# Patient Record
Sex: FEMALE | Race: WHITE | Employment: FULL TIME | URBAN - METROPOLITAN AREA
[De-identification: names, ages, dates, MRNs, and addresses within clinical notes are randomized per-mention and may not be internally consistent; named-entity substitution may affect disease eponyms.]

---

## 2018-05-12 LAB
ANTIBODY SCREEN, EXTERNAL: NEGATIVE
CHLAMYDIA, EXTERNAL: NEGATIVE
HBSAG, EXTERNAL: NEGATIVE
HCT, EXTERNAL: 38.6
HGB, EXTERNAL: 12.6
HIV, EXTERNAL: NORMAL
PLATELET CNT,   EXTERNAL: 424
RPR, EXTERNAL: NORMAL
RUBELLA, EXTERNAL: NORMAL
TYPE, ABO & RH, EXTERNAL: NORMAL

## 2018-08-10 ENCOUNTER — HOSPITAL ENCOUNTER (OUTPATIENT)
Dept: PERINATAL CARE | Age: 33
Discharge: HOME | End: 2018-08-10
Attending: OBSTETRICS & GYNECOLOGY
Payer: COMMERCIAL

## 2018-08-10 PROCEDURE — 76805 OB US >/= 14 WKS SNGL FETUS: CPT | Performed by: OBSTETRICS & GYNECOLOGY

## 2018-09-12 ENCOUNTER — HOSPITAL ENCOUNTER (OUTPATIENT)
Dept: PERINATAL CARE | Age: 33
Discharge: HOME | End: 2018-09-12
Attending: OBSTETRICS & GYNECOLOGY
Payer: COMMERCIAL

## 2018-09-12 PROCEDURE — 76816 OB US FOLLOW-UP PER FETUS: CPT | Performed by: OBSTETRICS & GYNECOLOGY

## 2018-09-20 LAB — GTT, 1 HR, GLUCOLA, EXTERNAL: 75

## 2018-10-31 ENCOUNTER — HOSPITAL ENCOUNTER (OUTPATIENT)
Dept: PERINATAL CARE | Age: 33
Discharge: HOME | End: 2018-10-31
Attending: OBSTETRICS & GYNECOLOGY
Payer: COMMERCIAL

## 2018-10-31 PROCEDURE — 76816 OB US FOLLOW-UP PER FETUS: CPT | Performed by: OBSTETRICS & GYNECOLOGY

## 2018-11-14 LAB — GRBS, EXTERNAL: NEGATIVE

## 2018-11-28 ENCOUNTER — HOSPITAL ENCOUNTER (OUTPATIENT)
Dept: PERINATAL CARE | Age: 33
Discharge: HOME | End: 2018-11-28
Attending: OBSTETRICS & GYNECOLOGY
Payer: COMMERCIAL

## 2018-11-28 ENCOUNTER — HOSPITAL ENCOUNTER (EMERGENCY)
Age: 33
Discharge: HOME | End: 2018-11-28
Attending: OBSTETRICS & GYNECOLOGY | Admitting: OBSTETRICS & GYNECOLOGY
Payer: COMMERCIAL

## 2018-11-28 VITALS
HEART RATE: 72 BPM | TEMPERATURE: 98.2 F | SYSTOLIC BLOOD PRESSURE: 137 MMHG | RESPIRATION RATE: 14 BRPM | OXYGEN SATURATION: 99 % | BODY MASS INDEX: 24.8 KG/M2 | WEIGHT: 140 LBS | DIASTOLIC BLOOD PRESSURE: 88 MMHG | HEIGHT: 63 IN

## 2018-11-28 LAB
ALBUMIN SERPL-MCNC: 3 G/DL (ref 3.5–5)
ALBUMIN/GLOB SERPL: 0.9 {RATIO} (ref 1.1–2.2)
ALP SERPL-CCNC: 202 U/L (ref 45–117)
ALT SERPL-CCNC: 13 U/L (ref 12–78)
ANION GAP SERPL CALC-SCNC: 16 MMOL/L (ref 5–15)
AST SERPL-CCNC: 18 U/L (ref 15–37)
BASOPHILS # BLD: 0 K/UL (ref 0–0.1)
BASOPHILS NFR BLD: 0 % (ref 0–1)
BILIRUB SERPL-MCNC: 0.6 MG/DL (ref 0.2–1)
BUN SERPL-MCNC: 8 MG/DL (ref 6–20)
BUN/CREAT SERPL: 14 (ref 12–20)
CALCIUM SERPL-MCNC: 8.8 MG/DL (ref 8.5–10.1)
CHLORIDE SERPL-SCNC: 105 MMOL/L (ref 97–108)
CO2 SERPL-SCNC: 20 MMOL/L (ref 21–32)
CREAT SERPL-MCNC: 0.58 MG/DL (ref 0.55–1.02)
CREAT UR-MCNC: 27.37 MG/DL
DIFFERENTIAL METHOD BLD: ABNORMAL
EOSINOPHIL # BLD: 0.1 K/UL (ref 0–0.4)
EOSINOPHIL NFR BLD: 1 % (ref 0–7)
ERYTHROCYTE [DISTWIDTH] IN BLOOD BY AUTOMATED COUNT: 12.8 % (ref 11.5–14.5)
GLOBULIN SER CALC-MCNC: 3.2 G/DL (ref 2–4)
GLUCOSE SERPL-MCNC: 82 MG/DL (ref 65–100)
HCT VFR BLD AUTO: 38.3 % (ref 35–47)
HGB BLD-MCNC: 13.4 G/DL (ref 11.5–16)
IMM GRANULOCYTES # BLD: 0.1 K/UL (ref 0–0.04)
IMM GRANULOCYTES NFR BLD AUTO: 1 % (ref 0–0.5)
LDH SERPL L TO P-CCNC: 133 U/L (ref 81–246)
LYMPHOCYTES # BLD: 1.9 K/UL (ref 0.8–3.5)
LYMPHOCYTES NFR BLD: 18 % (ref 12–49)
MCH RBC QN AUTO: 32.8 PG (ref 26–34)
MCHC RBC AUTO-ENTMCNC: 35 G/DL (ref 30–36.5)
MCV RBC AUTO: 93.6 FL (ref 80–99)
MONOCYTES # BLD: 0.8 K/UL (ref 0–1)
MONOCYTES NFR BLD: 8 % (ref 5–13)
NEUTS SEG # BLD: 7.6 K/UL (ref 1.8–8)
NEUTS SEG NFR BLD: 73 % (ref 32–75)
NRBC # BLD: 0 K/UL (ref 0–0.01)
NRBC BLD-RTO: 0 PER 100 WBC
PLATELET # BLD AUTO: 227 K/UL (ref 150–400)
PMV BLD AUTO: 11.5 FL (ref 8.9–12.9)
POTASSIUM SERPL-SCNC: 4.2 MMOL/L (ref 3.5–5.1)
PROT SERPL-MCNC: 6.2 G/DL (ref 6.4–8.2)
PROT UR-MCNC: <6 MG/DL (ref 0–11.9)
PROT/CREAT UR-RTO: NORMAL
RBC # BLD AUTO: 4.09 M/UL (ref 3.8–5.2)
SODIUM SERPL-SCNC: 141 MMOL/L (ref 136–145)
URATE SERPL-MCNC: 4.5 MG/DL (ref 2.6–6)
WBC # BLD AUTO: 10.4 K/UL (ref 3.6–11)

## 2018-11-28 PROCEDURE — 85025 COMPLETE CBC W/AUTO DIFF WBC: CPT

## 2018-11-28 PROCEDURE — 84156 ASSAY OF PROTEIN URINE: CPT

## 2018-11-28 PROCEDURE — 36415 COLL VENOUS BLD VENIPUNCTURE: CPT

## 2018-11-28 PROCEDURE — 83615 LACTATE (LD) (LDH) ENZYME: CPT

## 2018-11-28 PROCEDURE — 84550 ASSAY OF BLOOD/URIC ACID: CPT

## 2018-11-28 PROCEDURE — 80053 COMPREHEN METABOLIC PANEL: CPT

## 2018-11-28 PROCEDURE — 59025 FETAL NON-STRESS TEST: CPT

## 2018-11-28 PROCEDURE — 99285 EMERGENCY DEPT VISIT HI MDM: CPT

## 2018-11-28 PROCEDURE — 76816 OB US FOLLOW-UP PER FETUS: CPT | Performed by: OBSTETRICS & GYNECOLOGY

## 2018-11-28 RX ORDER — ACETAMINOPHEN 325 MG/1
650 TABLET ORAL
COMMUNITY
End: 2018-12-08

## 2018-11-28 RX ORDER — VALACYCLOVIR HYDROCHLORIDE 1 G/1
1000 TABLET, FILM COATED ORAL
COMMUNITY
End: 2018-12-08

## 2018-11-28 RX ORDER — RANITIDINE HCL 75 MG
75 TABLET ORAL 2 TIMES DAILY
COMMUNITY
End: 2020-08-11

## 2018-11-28 NOTE — H&P
Obstetrics Admission H&P Delfino Nicole [id number] 
1985 Chief Complaint:  Pregnancy and Chronic Hypertension HPI: 29 y.o. female  38w0d with Estimated Date of Delivery: 18 Pregnancy has been complicated by chronic hypertension. Hx of HTN on no meds. Had 24hr urine early in pregnancy that was 400mg. Presented to office this AM for routine visit. Had mild range pressures. Denied HA, RUQ pain, LE edema. Seen at [facility] today for marginal cord insertion. No issues. ROS:  Pertinent items are noted in HPI. OB History  Para Term  AB Living 2       1 SAB TAB Ectopic Molar Multiple Live Births 1 Past Medical History:  
Diagnosis Date  Bilobed placenta 2018  Essential hypertension CHTN never been medicated  Herpes simplex virus (HSV) infection  Marginal insertion of umbilical cord affecting management of mother 2018  Psychiatric problem   
 anxiety and depression as a teenager- was medicated; nothing currently and has not been medicated in ~18 years Past Surgical History:  
Procedure Laterality Date  HX TUMOR REMOVAL   Social History Socioeconomic History  Marital status:  Spouse name: Not on file  Number of children: Not on file  Years of education: Not on file  Highest education level: Not on file Social Needs  Financial resource strain: Not on file  Food insecurity - worry: Not on file  Food insecurity - inability: Not on file  Transportation needs - medical: Not on file  Transportation needs - non-medical: Not on file Occupational History  Not on file Tobacco Use  Smoking status: Former Smoker  Smokeless tobacco: Never Used Substance and Sexual Activity  Alcohol use: No  
  Alcohol/week: 0.0 oz Frequency: Never  Drug use: No  
 Sexual activity: Yes  
  Partners: Male Birth control/protection: Pill Other Topics Concern [address] Service Not Asked  Blood Transfusions Not Asked  Caffeine Concern Not Asked  Occupational Exposure Not Asked [place] Hazards Not Asked  Sleep Concern Not Asked  Stress Concern Not Asked  Weight Concern Not Asked  Special Diet Not Asked  Back Care Not Asked  Exercise Not Asked  Bike Helmet Not Asked  Seat Belt Not Asked  Self-Exams Not Asked Social History Narrative  Not on file Family History Problem Relation Age of Onset  Cancer Maternal Grandfather  Elevated Lipids Mother No Known Allergies Prior to Admission Medications Prescriptions Last Dose Informant Patient Reported? Taking? BABY ASPIRIN PO 11/27/2018 at Unknown time  Yes Yes Sig: Take  by mouth. acetaminophen (TYLENOL) 325 mg tablet 11/21/2018 at Unknown time  Yes Yes Sig: Take 650 mg by mouth every six (6) hours as needed for Pain. doxylamine succinate (UNISOM) 25 mg tablet 11/27/2018 at Unknown time  Yes Yes Sig: Take 25 mg by mouth nightly as needed for Sleep.  
norgestimate-ethinyl estradiol (ORTHO TRI-CYCLEN, TRI-SPRINTEC) 0.18/0.215/0.25 mg-35 mcg (28) tab Not Taking at Unknown time  Yes No  
Sig: TAKE ONE TABLET BY MOUTH ONE TIME DAILY (SKIP LAST 7 DAYS AND START NEW PACK) raNITIdine (ZANTAC 75) 75 mg tab 11/27/2018 at Unknown time  Yes Yes Sig: Take 75 mg by mouth two (2) times a day. valACYclovir (VALTREX) 1 gram tablet 11/27/2018 at Unknown time  Yes Yes Sig: Take 1,000 mg by mouth. Facility-Administered Medications: None Vitals:   
Patient Vitals for the past 8 hrs: 
 BP Temp Pulse Resp SpO2 Height Weight 11/28/18 1158     100 %    
11/28/18 1155 (!) 134/93  79      
11/28/18 1143     99 %    
11/28/18 1141 135/78  84      
11/28/18 1124 (!) 136/95  79  100 %    
11/28/18 1109 (!) 136/97  90  99 %    
11/28/18 1056 139/87  85      
11/28/18 1054     100 %   18 [place] 5' 3\" (1.6 m) 63.5 kg (140 lb)  
18 1040 (!) 136/92 98.2 °F (36.8 °C) 77 14     
18 1039     99 %    
18 1015 (!) 144/95  75     Temp (24hrs), Av.2 °F (36.8 °C), Min:98.2 °F (36.8 °C), Max:98.2 °F (36.8 °C) I&O:  No intake/output data recorded. No intake/output data recorded. Exam:  Patient without distress. Abdomen soft, non-tender Fundus soft and non tender Perineum No sign of blood or amniotic fluid Lower extremities edema No 
 
Cervical Exam: ftp/L/hi in office Membranes:   Intact Fetal Heart Rate: Reactive Baseline: 120 per minute Variability: moderate Accelerations: yes Decelerations: none Uterine contractions: irregular, every 2-5 minutes, notes mild discomfort Labs:  
Recent Results (from the past 24 hour(s)) CBC WITH AUTOMATED DIFF Collection Time: 18 10:28 AM  
Result Value Ref Range WBC 10.4 3.6 - 11.0 K/uL  
 RBC 4.09 3.80 - 5.20 M/uL  
 HGB 13.4 11.5 - 16.0 g/dL HCT 38.3 35.0 - 47.0 % MCV 93.6 80.0 - 99.0 FL  
 MCH 32.8 26.0 - 34.0 PG  
 MCHC 35.0 30.0 - 36.5 g/dL  
 RDW 12.8 11.5 - 14.5 % PLATELET 904 408 - 870 K/uL MPV 11.5 8.9 - 12.9 FL  
 NRBC 0.0 0  WBC ABSOLUTE NRBC 0.00 0.00 - 0.01 K/uL NEUTROPHILS 73 32 - 75 % LYMPHOCYTES 18 12 - 49 % MONOCYTES 8 5 - 13 % EOSINOPHILS 1 0 - 7 % BASOPHILS 0 0 - 1 % IMMATURE GRANULOCYTES 1 (H) 0.0 - 0.5 % ABS. NEUTROPHILS 7.6 1.8 - 8.0 K/UL  
 ABS. LYMPHOCYTES 1.9 0.8 - 3.5 K/UL  
 ABS. MONOCYTES 0.8 0.0 - 1.0 K/UL  
 ABS. EOSINOPHILS 0.1 0.0 - 0.4 K/UL  
 ABS. BASOPHILS 0.0 0.0 - 0.1 K/UL  
 ABS. IMM. GRANS. 0.1 (H) 0.00 - 0.04 K/UL  
 DF AUTOMATED METABOLIC PANEL, COMPREHENSIVE Collection Time: 18 10:28 AM  
Result Value Ref Range Sodium 141 136 - 145 mmol/L Potassium 4.2 3.5 - 5.1 mmol/L  Chloride 105 97 - 108 mmol/L  
 CO2 20 (L) 21 - 32 mmol/L Anion gap 16 (H) 5 - 15 mmol/L Glucose 82 65 - 100 mg/dL BUN 8 6 - 20 MG/DL Creatinine 0.58 0.55 - 1.02 MG/DL  
 BUN/Creatinine ratio 14 12 - 20 GFR est AA >60 >60 ml/min/1.73m2 GFR est non-AA >60 >60 ml/min/1.73m2 Calcium 8.8 8.5 - 10.1 MG/DL Bilirubin, total 0.6 0.2 - 1.0 MG/DL  
 ALT (SGPT) 13 12 - 78 U/L  
 AST (SGOT) 18 15 - 37 U/L Alk. phosphatase 202 (H) 45 - 117 U/L Protein, total 6.2 (L) 6.4 - 8.2 g/dL Albumin 3.0 (L) 3.5 - 5.0 g/dL Globulin 3.2 2.0 - 4.0 g/dL A-G Ratio 0.9 (L) 1.1 - 2.2 PROTEIN/CREATININE RATIO, URINE Collection Time: 11/28/18 10:28 AM  
Result Value Ref Range Protein, urine random <6 0.0 - 11.9 mg/dL Creatinine, urine 27.37 mg/dL Protein/Creat. urine Ratio Cannot be calculated LD Collection Time: 11/28/18 10:28 AM  
Result Value Ref Range  81 - 246 U/L  
URIC ACID Collection Time: 11/28/18 10:28 AM  
Result Value Ref Range Uric acid 4.5 2.6 - 6.0 MG/DL Assessment and Plan: 1. CHTN: persistent mild range pressures, normal labs, Pr/Cr to low to calculate 
- discharge home w 24hr urine; return to office - BP check next Mon 
- preE precautions reviewed - scheduled for IOL 12/5 for CHTN not on meds, michael Pearce MD 
[facility]

## 2018-11-28 NOTE — DISCHARGE INSTRUCTIONS
Scheduled induction Wednesday, December 5, 2018 at 4:00 pm and [address] and Delivery. High Blood Pressure in Pregnancy: Care Instructions  Your Care Instructions    High blood pressure (hypertension) means that the force of blood against your artery walls is too strong. Mild high blood pressure during pregnancy is not usually dangerous. Your doctor will probably just want to watch you closely. But when blood pressure is very high, it can reduce oxygen to your baby. This can affect how well your baby grows. High blood pressure also means that you are at higher risk for:  · Preeclampsia. This is a problem that includes high blood pressure and damage to your liver or kidneys. It can also reduce how much oxygen your baby gets. In some cases, it leads to eclampsia. Eclampsia causes seizures. · Placental abruption. This is a problem when the placenta separates from the uterus before birth. It prevents the baby from getting enough oxygen and nutrients. Sometimes it can cause death for the baby and the mother. To prevent problems for you or your baby, you will have to check your blood pressure often. You will do this until after your baby is born. If your blood pressure rises suddenly or is very high during your pregnancy, your doctor may prescribe medicines. They can usually control blood pressure. If your blood pressure affects your or your baby's health, your doctor may need to deliver your baby early. After your baby is born, your blood pressure will probably improve. But sometimes blood pressure problems continue after birth. Follow-up care is a key part of your treatment and safety. Be sure to make and go to all appointments, and call your doctor if you are having problems. It's also a good idea to know your test results and keep a list of the medicines you take. How can you care for yourself at home? · Take and write down your blood pressure at home if your doctor tells you to.   · Take your medicines exactly as prescribed. Call your doctor if you think you are having a problem with your medicine. · Do not smoke. If you need help quitting, talk to your doctor about stop-smoking programs and medicines. These can increase your chances of quitting for good. · Do not gain too much weight during your pregnancy. Talk to your doctor about how much weight gain is healthy. · Eat a healthy diet. · If your doctor says it's okay, get regular exercise. Walking or swimming several times a week can be healthy for you and your baby. · Reduce stress, and find time to relax. This is very important if you continue to work or have a busy schedule. It's also important if you have small children at home. When should you call for help? Call 911 anytime you think you may need emergency care. For example, call if:    · You passed out (lost consciousness).     · You have a seizure.    Call your doctor now or seek immediate medical care if:    · You have symptoms of preeclampsia, such as:  ? Sudden swelling of your face, hands, or feet. ? New vision problems (such as dimness or blurring). ? A severe headache.     · Your blood pressure is higher than it should be or rises suddenly.     · You have new nausea or vomiting.     · You think that you are in labor.     · You have pain in your belly or pelvis.    Watch closely for changes in your health, and be sure to contact your doctor if:    · You gain weight rapidly. Where can you learn more? Go to http://yair-jayme.info/. Enter [phone] in the search box to learn more about \"High Blood Pressure in Pregnancy: Care Instructions. \"  Current as of: November 21, 2017  Content Version: 11.8  © 1658-6013 Healthwise, Incorporated. Care instructions adapted under license by [employer] (which disclaims liability or warranty for this information).  If you have questions about a medical condition or this instruction, always ask your healthcare professional. [address] any warranty or liability for your use of this information. [place] Contractions: Care Instructions  Your Care Instructions    [place] contractions prepare your uterus for labor. Think of them as a \"warm-up\" exercise that your body does. You may begin to feel them between the 28th and 30th weeks of your pregnancy. But they start as early as the 20th week. [place] contractions usually occur more often during the ninth month. They may go away when you are active and return when you rest. These contractions are like mild contractions of true labor, but they occur less often. (You feel fewer than 8 in an hour.) They don't cause your cervix to open. It may be hard for you to tell the difference between [place] contractions and true labor, especially in your first pregnancy. Follow-up care is a key part of your treatment and safety. Be sure to make and go to all appointments, and call your doctor if you are having problems. It's also a good idea to know your test results and keep a list of the medicines you take. How can you care for yourself at home? · Try a warm bath to help relieve muscle tension and reduce pain. · Change positions every 30 minutes. Take breaks if you must sit for a long time. Get up and walk around. · Drink plenty of water, enough so that your urine is light yellow or clear like water. · Taking short walks may help you feel better. Your doctor needs to check any contractions that are getting stronger or closer together. Where can you learn more? Go to http://yair-jayme.info/. Enter [id number] in the search box to learn more about \"[place]illa Contractions: Care Instructions. \"  Current as of: November 21, 2017  Content Version: 11.8  © 0980-6199 Healthwise, [employer].  Care instructions adapted under license by [employer] (which disclaims liability or warranty for this information). If you have questions about a medical condition or this instruction, always ask your healthcare professional. [address] any warranty or liability for your use of this information. Pregnancy Precautions: Care Instructions  Your Care Instructions    There is no sure way to prevent labor before your due date ( labor) or to prevent most other pregnancy problems. But there are things you can do to increase your chances of a healthy pregnancy. Go to your appointments, follow your doctor's advice, and take good care of yourself. Eat well, and exercise (if your doctor agrees). And make sure to drink plenty of water. Follow-up care is a key part of your treatment and safety. Be sure to make and go to all appointments, and call your doctor if you are having problems. It's also a good idea to know your test results and keep a list of the medicines you take. How can you care for yourself at home? · Make sure you go to your prenatal appointments. At each visit, your doctor will check your blood pressure. Your doctor will also check to see if you have protein in your urine. High blood pressure and protein in urine are signs of preeclampsia. This condition can be dangerous for you and your baby. · Drink plenty of fluids, enough so that your urine is light yellow or clear like water. Dehydration can cause contractions. If you have kidney, heart, or liver disease and have to limit fluids, talk with your doctor before you increase the amount of fluids you drink. · Tell your doctor right away if you notice any symptoms of an infection, such as:  ? Burning when you urinate. ? A foul-smelling discharge from your vagina. ? Vaginal itching. ? Unexplained fever. ? Unusual pain or soreness in your uterus or lower belly. · Eat a balanced diet. Include plenty of foods that are high in calcium and iron. ?  Foods high in calcium include milk, cheese, yogurt, almonds, and broccoli. ? Foods high in iron include red meat, shellfish, poultry, eggs, beans, raisins, whole-grain bread, and leafy green vegetables. · Do not smoke. If you need help quitting, talk to your doctor about stop-smoking programs and medicines. These can increase your chances of quitting for good. · Do not drink alcohol or use illegal drugs. · Follow your doctor's directions about activity. Your doctor will let you know how much, if any, exercise you can do. · Ask your doctor if you can have sex. If you are at risk for early labor, your doctor may ask you to not have sex. · Take care to prevent falls. During pregnancy, your joints are loose, and your balance is off. Sports such as bicycling, skiing, or in-line skating can increase your risk of falling. And don't ride horses or motorcycles, dive, water ski, scuba dive, or parachute jump while you are pregnant. · Avoid getting very hot. Do not use saunas or hot tubs. Avoid staying out in the sun in hot weather for long periods. Take acetaminophen (Tylenol) to lower a high fever. · Do not take any over-the-counter or herbal medicines or supplements without talking to your doctor or pharmacist first.  When should you call for help? Call 911 anytime you think you may need emergency care. For example, call if:    · You passed out (lost consciousness).     · You have severe vaginal bleeding.     · You have severe pain in your belly or pelvis.     · You have had fluid gushing or leaking from your vagina and you know or think the umbilical cord is bulging into your vagina. If this happens, immediately get down on your knees so your rear end (buttocks) is higher than your head. This will decrease the pressure on the cord until help arrives.   [facility] your doctor now or seek immediate medical care if:    · You have signs of preeclampsia, such as:  ? Sudden swelling of your face, hands, or feet. ? New vision problems (such as dimness or blurring). ? A severe headache.   · You have any vaginal bleeding.     · You have belly pain or cramping.     · You have a fever.     · You have had regular contractions (with or without pain) for an hour. This means that you have 8 or more within 1 hour or 4 or more in 20 minutes after you change your position and drink fluids.     · You have a sudden release of fluid from your vagina.     · You have low back pain or pelvic pressure that does not go away.     · You notice that your baby has stopped moving or is moving much less than normal.    Watch closely for changes in your health, and be sure to contact your doctor if you have any problems. Where can you learn more? Go to http://yairGreenlotsjayme.info/. Enter [id number] in the search box to learn more about \"Pregnancy Precautions: Care Instructions. \"  Current as of: 2017  Content Version: 11.8  © 3009-8310 [employer]. Care instructions adapted under license by [employer] (which disclaims liability or warranty for this information). If you have questions about a medical condition or this instruction, always ask your healthcare professional. [place] [address] any warranty or liability for your use of this information. Week 38 of Your Pregnancy: Care Instructions  Your Care Instructions    Believe it or not, your baby is almost here. You may have ideas about your baby's personality because of how much he or she moves. Or you may have noticed how he or she responds to sounds, warmth, cold, and light. You may even know what kind of music your baby likes. By now, you have a better idea of what to expect during delivery. You may have talked about your birth preferences with your doctor. But even if you want a vaginal birth, it is a good idea to learn about  births.  birth means that your baby is born through a cut (incision) in your lower belly.  It is sometimes the best choice for the health of the baby and the mother. This care sheet can help you understand  births. It also gives you information about what to expect after your baby is born. And it helps you understand more about postpartum depression. Follow-up care is a key part of your treatment and safety. Be sure to make and go to all appointments, and call your doctor if you are having problems. It's also a good idea to know your test results and keep a list of the medicines you take. How can you care for yourself at home? Learn about  birth  · Most C-sections are unplanned. They are done because of problems that occur during labor. These problems might include:  ? Labor that slows or stops. ? High blood pressure or other problems for the mother. ? Signs of distress in the baby. These signs may include a very fast or slow heart rate. · Although most mothers and babies do well after , it is major surgery. It has more risks than a vaginal delivery. · In some cases, a planned  may be safer than a vaginal delivery. This may be the case if:  ? The mother has a health problem, such as a heart condition. ? The baby isn't in a head-down position for delivery. This is called a breech position. ? The uterus has scars from past surgeries. This could increase the chance of a tear in the uterus. ? There is a problem with the placenta. ? The mother has an infection, such as genital herpes, that could be spread to the baby. ? The mother is having twins or more. ? The baby weighs 9 to 10 pounds or more. · Because of the risks of , planned C-sections generally should be done only for medical reasons. And a planned  should be done at 39 weeks or later unless there is a medical reason to do it sooner. Know what to expect after delivery, and plan for the first few weeks at home  · You, your baby, and your partner or  will get identification bands.  Only people with matching bands can  the baby from the nursery. · You will learn how to feed, diaper, and bathe your baby. And you will learn how to care for the umbilical cord stump. If your baby will be circumcised, you will also learn how to care for that. · Ask people to wait to visit you until you are at home. And ask them to wash their hands before they touch your baby. · Make sure you have another adult in your home for at least 2 or 3 days after the birth. · During the first 2 weeks, limit when friends and family can visit. · Do not allow visitors who have colds or infections. Make sure all visitors are up to date with their vaccinations. Never let anyone smoke around your baby. · Try to nap when the baby naps. Be aware of postpartum depression  · \"Baby blues\" are common for the first 1 to 2 weeks after birth. You may cry or feel sad or irritable for no reason. · For some women, these feelings last longer and are more intense. This is called postpartum depression. · If your symptoms last for more than a few weeks or you feel very depressed, ask your doctor for help. · Postpartum depression can be treated. Support groups and counseling can help. Sometimes medicine can also help. Where can you learn more? Go to http://yair-jayme.info/. Enter B044 in the search box to learn more about \"Week 38 of Your Pregnancy: Care Instructions. \"  Current as of: November 21, 2017  Content Version: 11.8  © 5885-8795 [employer] [place] - 2. Care instructions adapted under license by [employer] (which disclaims liability or warranty for this information). If you have questions about a medical condition or this instruction, always ask your healthcare professional. [address] any warranty or liability for your use of this information.

## 2018-11-28 NOTE — PROGRESS NOTES
1013 Placed on EFM at this time for NST. [address] collected. 24 hour urine initiated. 1127 Assisted patient to bathroom. 1230 Discussed labs with Dr. Barnes Staff. MD reviewed labs and BPs. Will come see patient and discuss plan of care. 1303 Removed from EFM after Dr. Barnes Staff reviewed tracing. 350 [facility] Dr. Barnes Staff reviewed labs. Discharge orders received. Patient to finish 24 hour urine at home and return it to office tomorrow once completed. Induction scheduled for December 5th at 1600 as rodriguez balloon. 1318 Reviewed discharge instructions with patient. Patient states understanding of all instructions and urine collection process for 24 hour urine. Singed copy of discharge papers placed on hard chart. 1321 Patient and significant other ambulatory off unit without distress. Discharged home to collect 24 hour urine.

## 2018-12-05 ENCOUNTER — HOSPITAL ENCOUNTER (INPATIENT)
Age: 33
LOS: 3 days | Discharge: HOME | End: 2018-12-08
Attending: OBSTETRICS & GYNECOLOGY | Admitting: OBSTETRICS & GYNECOLOGY
Payer: COMMERCIAL

## 2018-12-05 DIAGNOSIS — G89.18 POSTOPERATIVE PAIN: Primary | ICD-10-CM

## 2018-12-05 LAB
ERYTHROCYTE [DISTWIDTH] IN BLOOD BY AUTOMATED COUNT: 12.7 % (ref 11.5–14.5)
HCT VFR BLD AUTO: 35.9 % (ref 35–47)
HGB BLD-MCNC: 12.7 G/DL (ref 11.5–16)
MCH RBC QN AUTO: 33 PG (ref 26–34)
MCHC RBC AUTO-ENTMCNC: 35.4 G/DL (ref 30–36.5)
MCV RBC AUTO: 93.2 FL (ref 80–99)
NRBC # BLD: 0 K/UL (ref 0–0.01)
NRBC BLD-RTO: 0 PER 100 WBC
PLATELET # BLD AUTO: 253 K/UL (ref 150–400)
PMV BLD AUTO: 11.7 FL (ref 8.9–12.9)
RBC # BLD AUTO: 3.85 M/UL (ref 3.8–5.2)
WBC # BLD AUTO: 10.4 K/UL (ref 3.6–11)

## 2018-12-05 PROCEDURE — 75410000002 HC LABOR FEE PER 1 HR: Performed by: OBSTETRICS & GYNECOLOGY

## 2018-12-05 PROCEDURE — 77010026065 HC OXYGEN MINIMUM MEDICAL AIR: Performed by: OBSTETRICS & GYNECOLOGY

## 2018-12-05 PROCEDURE — 4A0HXCZ MEASUREMENT OF PRODUCTS OF CONCEPTION, CARDIAC RATE, EXTERNAL APPROACH: ICD-10-PCS | Performed by: OBSTETRICS & GYNECOLOGY

## 2018-12-05 PROCEDURE — 65270000029 HC RM PRIVATE

## 2018-12-05 PROCEDURE — 74011250637 HC RX REV CODE- 250/637: Performed by: OBSTETRICS & GYNECOLOGY

## 2018-12-05 PROCEDURE — 85027 COMPLETE CBC AUTOMATED: CPT

## 2018-12-05 PROCEDURE — 59200 INSERT CERVICAL DILATOR: CPT | Performed by: OBSTETRICS & GYNECOLOGY

## 2018-12-05 PROCEDURE — 36415 COLL VENOUS BLD VENIPUNCTURE: CPT

## 2018-12-05 PROCEDURE — 3E033VJ INTRODUCTION OF OTHER HORMONE INTO PERIPHERAL VEIN, PERCUTANEOUS APPROACH: ICD-10-PCS | Performed by: OBSTETRICS & GYNECOLOGY

## 2018-12-05 PROCEDURE — 77030034696 HC CATH URETH FOL 2W BARD -A

## 2018-12-05 RX ORDER — SODIUM CHLORIDE 0.9 % (FLUSH) 0.9 %
5-10 SYRINGE (ML) INJECTION EVERY 8 HOURS
Status: DISCONTINUED | OUTPATIENT
Start: 2018-12-05 | End: 2018-12-08 | Stop reason: HOSPADM

## 2018-12-05 RX ORDER — NALOXONE HYDROCHLORIDE 0.4 MG/ML
0.4 INJECTION, SOLUTION INTRAMUSCULAR; INTRAVENOUS; SUBCUTANEOUS AS NEEDED
Status: DISCONTINUED | OUTPATIENT
Start: 2018-12-05 | End: 2018-12-06 | Stop reason: HOSPADM

## 2018-12-05 RX ORDER — ACETAMINOPHEN 500 MG
1000 TABLET ORAL ONCE
Status: COMPLETED | OUTPATIENT
Start: 2018-12-05 | End: 2018-12-05

## 2018-12-05 RX ORDER — BUTORPHANOL TARTRATE 1 MG/ML
1 INJECTION INTRAMUSCULAR; INTRAVENOUS
Status: DISCONTINUED | OUTPATIENT
Start: 2018-12-05 | End: 2018-12-08 | Stop reason: HOSPADM

## 2018-12-05 RX ORDER — SODIUM CHLORIDE, SODIUM LACTATE, POTASSIUM CHLORIDE, CALCIUM CHLORIDE 600; 310; 30; 20 MG/100ML; MG/100ML; MG/100ML; MG/100ML
125 INJECTION, SOLUTION INTRAVENOUS CONTINUOUS
Status: DISCONTINUED | OUTPATIENT
Start: 2018-12-05 | End: 2018-12-08 | Stop reason: HOSPADM

## 2018-12-05 RX ORDER — OXYTOCIN/0.9 % SODIUM CHLORIDE 30/500 ML
0-20 PLASTIC BAG, INJECTION (ML) INTRAVENOUS
Status: DISCONTINUED | OUTPATIENT
Start: 2018-12-06 | End: 2018-12-08 | Stop reason: HOSPADM

## 2018-12-05 RX ORDER — ACETAMINOPHEN 325 MG/1
650 TABLET ORAL
Status: DISCONTINUED | OUTPATIENT
Start: 2018-12-05 | End: 2018-12-08 | Stop reason: HOSPADM

## 2018-12-05 RX ORDER — ONDANSETRON 2 MG/ML
4 INJECTION INTRAMUSCULAR; INTRAVENOUS
Status: DISCONTINUED | OUTPATIENT
Start: 2018-12-05 | End: 2018-12-06 | Stop reason: HOSPADM

## 2018-12-05 RX ORDER — ZOLPIDEM TARTRATE 5 MG/1
5 TABLET ORAL
Status: DISCONTINUED | OUTPATIENT
Start: 2018-12-05 | End: 2018-12-08 | Stop reason: HOSPADM

## 2018-12-05 RX ORDER — SODIUM CHLORIDE 0.9 % (FLUSH) 0.9 %
5-10 SYRINGE (ML) INJECTION AS NEEDED
Status: DISCONTINUED | OUTPATIENT
Start: 2018-12-05 | End: 2018-12-08 | Stop reason: HOSPADM

## 2018-12-05 RX ADMIN — ACETAMINOPHEN 1000 MG: 500 TABLET ORAL at 21:41

## 2018-12-05 RX ADMIN — ZOLPIDEM TARTRATE 5 MG: 5 TABLET ORAL at 21:42

## 2018-12-05 NOTE — PROGRESS NOTES
1540 Patient admitted to room 208, accompanied by SO, for rodriguez bulb induction for chronic hypertension. 1556 Patient placed on EFM at this time. 1605 Patient assessment completed. Patient and SO oriented to room and unit including call bell and emergency cord. 1620 IV placed by this RN. 1700 Patient resting in bed. No needs expressed at this time. 5 Dr. Mary Damian calling to unit for update on patient. MD updated on patient status, EFM tracing, and tentative plan of care. Per MD,  Dr. Tra Byrd or Dr. Mary Damian will place rodriguez [facility] Dr. Tra Byrd at bedside for rodriguez bulb placement. 1810 Rodriguez bulb successfully placed and inflated to 60 cc. Patient tolerating well. VORB for regular diet until midnight, PO Ambien and tylenol PRN. Patient may have stadol and phenergan for pain, if needed. Per MD, continue EFM for one more hour. 1900 Bedside and Verbal shift change report given to JUANA Zuluaga RN (oncoming nurse) by this RN (offgoing nurse). Report included the following information SBAR, Kardex, Intake/Output, MAR and Recent Results.

## 2018-12-06 ENCOUNTER — ANESTHESIA (OUTPATIENT)
Dept: LABOR AND DELIVERY | Age: 33
End: 2018-12-06
Payer: COMMERCIAL

## 2018-12-06 ENCOUNTER — ANESTHESIA EVENT (OUTPATIENT)
Dept: LABOR AND DELIVERY | Age: 33
End: 2018-12-06
Payer: COMMERCIAL

## 2018-12-06 PROCEDURE — 65270000029 HC RM PRIVATE

## 2018-12-06 PROCEDURE — 74011250636 HC RX REV CODE- 250/636

## 2018-12-06 PROCEDURE — 77030018809 HC RETRCTR ALXSO DISP AMR -B

## 2018-12-06 PROCEDURE — 77030014125 HC TY EPDRL BBMI -B: Performed by: ANESTHESIOLOGY

## 2018-12-06 PROCEDURE — 76010000391 HC C SECN FIRST 1 HR: Performed by: OBSTETRICS & GYNECOLOGY

## 2018-12-06 PROCEDURE — 74011250636 HC RX REV CODE- 250/636: Performed by: OBSTETRICS & GYNECOLOGY

## 2018-12-06 PROCEDURE — 75410000003 HC RECOV DEL/VAG/CSECN EA 0.5 HR: Performed by: OBSTETRICS & GYNECOLOGY

## 2018-12-06 PROCEDURE — 74011000250 HC RX REV CODE- 250

## 2018-12-06 PROCEDURE — 77030018836 HC SOL IRR NACL ICUM -A

## 2018-12-06 PROCEDURE — 76010000392 HC C SECN EA ADDL 0.5 HR: Performed by: OBSTETRICS & GYNECOLOGY

## 2018-12-06 PROCEDURE — 74011250636 HC RX REV CODE- 250/636: Performed by: ANESTHESIOLOGY

## 2018-12-06 PROCEDURE — 74011000250 HC RX REV CODE- 250: Performed by: ANESTHESIOLOGY

## 2018-12-06 PROCEDURE — 76060000078 HC EPIDURAL ANESTHESIA

## 2018-12-06 PROCEDURE — 76060000032 HC ANESTHESIA 0.5 TO 1 HR

## 2018-12-06 PROCEDURE — 76060000033 HC ANESTHESIA 1 TO 1.5 HR: Performed by: OBSTETRICS & GYNECOLOGY

## 2018-12-06 PROCEDURE — 76010000391 HC C SECN FIRST 1 HR

## 2018-12-06 PROCEDURE — 75410000002 HC LABOR FEE PER 1 HR: Performed by: OBSTETRICS & GYNECOLOGY

## 2018-12-06 PROCEDURE — 76210000006 HC OR PH I REC 0.5 TO 1 HR

## 2018-12-06 PROCEDURE — 77030032490 HC SLV COMPR SCD KNE COVD -B

## 2018-12-06 PROCEDURE — 74011250637 HC RX REV CODE- 250/637: Performed by: OBSTETRICS & GYNECOLOGY

## 2018-12-06 RX ORDER — SODIUM CHLORIDE 0.9 % (FLUSH) 0.9 %
5-10 SYRINGE (ML) INJECTION AS NEEDED
Status: DISCONTINUED | OUTPATIENT
Start: 2018-12-06 | End: 2018-12-08 | Stop reason: HOSPADM

## 2018-12-06 RX ORDER — SUCCINYLCHOLINE CHLORIDE 20 MG/ML
INJECTION INTRAMUSCULAR; INTRAVENOUS AS NEEDED
Status: DISCONTINUED | OUTPATIENT
Start: 2018-12-06 | End: 2018-12-06 | Stop reason: HOSPADM

## 2018-12-06 RX ORDER — MORPHINE SULFATE 0.5 MG/ML
INJECTION, SOLUTION EPIDURAL; INTRATHECAL; INTRAVENOUS AS NEEDED
Status: DISCONTINUED | OUTPATIENT
Start: 2018-12-06 | End: 2018-12-06 | Stop reason: HOSPADM

## 2018-12-06 RX ORDER — FENTANYL CITRATE 50 UG/ML
INJECTION, SOLUTION INTRAMUSCULAR; INTRAVENOUS AS NEEDED
Status: DISCONTINUED | OUTPATIENT
Start: 2018-12-06 | End: 2018-12-06 | Stop reason: HOSPADM

## 2018-12-06 RX ORDER — IBUPROFEN 800 MG/1
800 TABLET ORAL EVERY 8 HOURS
Status: DISCONTINUED | OUTPATIENT
Start: 2018-12-06 | End: 2018-12-08 | Stop reason: HOSPADM

## 2018-12-06 RX ORDER — ACETAMINOPHEN 325 MG/1
650 TABLET ORAL
Status: DISCONTINUED | OUTPATIENT
Start: 2018-12-06 | End: 2018-12-08 | Stop reason: HOSPADM

## 2018-12-06 RX ORDER — FENTANYL/BUPIVACAINE/NS/PF 2-1250MCG
1-16 PREFILLED PUMP RESERVOIR EPIDURAL CONTINUOUS
Status: DISCONTINUED | OUTPATIENT
Start: 2018-12-06 | End: 2018-12-06 | Stop reason: HOSPADM

## 2018-12-06 RX ORDER — OXYTOCIN/RINGER'S LACTATE 20/1000 ML
125-500 PLASTIC BAG, INJECTION (ML) INTRAVENOUS ONCE
Status: COMPLETED | OUTPATIENT
Start: 2018-12-06 | End: 2018-12-06

## 2018-12-06 RX ORDER — HYDROMORPHONE HYDROCHLORIDE 2 MG/ML
0.5 INJECTION, SOLUTION INTRAMUSCULAR; INTRAVENOUS; SUBCUTANEOUS
Status: DISPENSED | OUTPATIENT
Start: 2018-12-06 | End: 2018-12-07

## 2018-12-06 RX ORDER — EPHEDRINE SULFATE 50 MG/ML
10 INJECTION, SOLUTION INTRAVENOUS
Status: COMPLETED | OUTPATIENT
Start: 2018-12-06 | End: 2018-12-06

## 2018-12-06 RX ORDER — BUPIVACAINE HYDROCHLORIDE 2.5 MG/ML
INJECTION, SOLUTION EPIDURAL; INFILTRATION; INTRACAUDAL AS NEEDED
Status: DISCONTINUED | OUTPATIENT
Start: 2018-12-06 | End: 2018-12-06 | Stop reason: HOSPADM

## 2018-12-06 RX ORDER — SODIUM CHLORIDE, SODIUM LACTATE, POTASSIUM CHLORIDE, CALCIUM CHLORIDE 600; 310; 30; 20 MG/100ML; MG/100ML; MG/100ML; MG/100ML
125 INJECTION, SOLUTION INTRAVENOUS CONTINUOUS
Status: DISCONTINUED | OUTPATIENT
Start: 2018-12-06 | End: 2018-12-08 | Stop reason: HOSPADM

## 2018-12-06 RX ORDER — ONDANSETRON 4 MG/1
4 TABLET, ORALLY DISINTEGRATING ORAL
Status: DISCONTINUED | OUTPATIENT
Start: 2018-12-06 | End: 2018-12-08 | Stop reason: HOSPADM

## 2018-12-06 RX ORDER — OXYCODONE HYDROCHLORIDE 5 MG/1
10 TABLET ORAL
Status: ACTIVE | OUTPATIENT
Start: 2018-12-06 | End: 2018-12-07

## 2018-12-06 RX ORDER — EPHEDRINE SULFATE 50 MG/ML
10 INJECTION, SOLUTION INTRAVENOUS ONCE
Status: COMPLETED | OUTPATIENT
Start: 2018-12-06 | End: 2018-12-06

## 2018-12-06 RX ORDER — NALOXONE HYDROCHLORIDE 0.4 MG/ML
0.4 INJECTION, SOLUTION INTRAMUSCULAR; INTRAVENOUS; SUBCUTANEOUS AS NEEDED
Status: DISCONTINUED | OUTPATIENT
Start: 2018-12-06 | End: 2018-12-08 | Stop reason: HOSPADM

## 2018-12-06 RX ORDER — SIMETHICONE 80 MG
80 TABLET,CHEWABLE ORAL AS NEEDED
Status: DISCONTINUED | OUTPATIENT
Start: 2018-12-06 | End: 2018-12-08 | Stop reason: HOSPADM

## 2018-12-06 RX ORDER — CEFAZOLIN SODIUM/WATER 2 G/20 ML
2 SYRINGE (ML) INTRAVENOUS ONCE
Status: COMPLETED | OUTPATIENT
Start: 2018-12-06 | End: 2018-12-06

## 2018-12-06 RX ORDER — LIDOCAINE HYDROCHLORIDE AND EPINEPHRINE 15; 5 MG/ML; UG/ML
INJECTION, SOLUTION EPIDURAL
Status: COMPLETED | OUTPATIENT
Start: 2018-12-06 | End: 2018-12-06

## 2018-12-06 RX ORDER — KETOROLAC TROMETHAMINE 30 MG/ML
30 INJECTION, SOLUTION INTRAMUSCULAR; INTRAVENOUS
Status: DISPENSED | OUTPATIENT
Start: 2018-12-06 | End: 2018-12-07

## 2018-12-06 RX ORDER — DIPHENHYDRAMINE HCL 25 MG
25 CAPSULE ORAL
Status: DISCONTINUED | OUTPATIENT
Start: 2018-12-06 | End: 2018-12-08 | Stop reason: HOSPADM

## 2018-12-06 RX ORDER — OXYCODONE AND ACETAMINOPHEN 5; 325 MG/1; MG/1
1 TABLET ORAL
Status: DISCONTINUED | OUTPATIENT
Start: 2018-12-06 | End: 2018-12-08 | Stop reason: HOSPADM

## 2018-12-06 RX ORDER — OXYCODONE HYDROCHLORIDE 5 MG/1
5 TABLET ORAL
Status: ACTIVE | OUTPATIENT
Start: 2018-12-06 | End: 2018-12-07

## 2018-12-06 RX ORDER — OXYTOCIN 10 [USP'U]/ML
INJECTION, SOLUTION INTRAMUSCULAR; INTRAVENOUS AS NEEDED
Status: DISCONTINUED | OUTPATIENT
Start: 2018-12-06 | End: 2018-12-06 | Stop reason: HOSPADM

## 2018-12-06 RX ORDER — KETOROLAC TROMETHAMINE 30 MG/ML
INJECTION, SOLUTION INTRAMUSCULAR; INTRAVENOUS AS NEEDED
Status: DISCONTINUED | OUTPATIENT
Start: 2018-12-06 | End: 2018-12-06 | Stop reason: HOSPADM

## 2018-12-06 RX ORDER — MORPHINE SULFATE 0.5 MG/ML
INJECTION, SOLUTION EPIDURAL; INTRATHECAL; INTRAVENOUS AS NEEDED
Status: DISCONTINUED | OUTPATIENT
Start: 2018-12-06 | End: 2018-12-06

## 2018-12-06 RX ORDER — DOCUSATE SODIUM 100 MG/1
100 CAPSULE, LIQUID FILLED ORAL 2 TIMES DAILY
Status: DISCONTINUED | OUTPATIENT
Start: 2018-12-06 | End: 2018-12-08 | Stop reason: HOSPADM

## 2018-12-06 RX ORDER — LIDOCAINE HYDROCHLORIDE 20 MG/ML
INJECTION, SOLUTION EPIDURAL; INFILTRATION; INTRACAUDAL; PERINEURAL AS NEEDED
Status: DISCONTINUED | OUTPATIENT
Start: 2018-12-06 | End: 2018-12-06 | Stop reason: HOSPADM

## 2018-12-06 RX ORDER — NALBUPHINE HYDROCHLORIDE 10 MG/ML
5 INJECTION, SOLUTION INTRAMUSCULAR; INTRAVENOUS; SUBCUTANEOUS
Status: ACTIVE | OUTPATIENT
Start: 2018-12-06 | End: 2018-12-07

## 2018-12-06 RX ORDER — PROPOFOL 10 MG/ML
INJECTION, EMULSION INTRAVENOUS AS NEEDED
Status: DISCONTINUED | OUTPATIENT
Start: 2018-12-06 | End: 2018-12-06 | Stop reason: HOSPADM

## 2018-12-06 RX ORDER — CEFAZOLIN SODIUM 1 G/3ML
INJECTION, POWDER, FOR SOLUTION INTRAMUSCULAR; INTRAVENOUS AS NEEDED
Status: DISCONTINUED | OUTPATIENT
Start: 2018-12-06 | End: 2018-12-06 | Stop reason: HOSPADM

## 2018-12-06 RX ORDER — OXYTOCIN/RINGER'S LACTATE 20/1000 ML
PLASTIC BAG, INJECTION (ML) INTRAVENOUS
Status: COMPLETED
Start: 2018-12-06 | End: 2018-12-06

## 2018-12-06 RX ORDER — SODIUM CHLORIDE 0.9 % (FLUSH) 0.9 %
5-10 SYRINGE (ML) INJECTION EVERY 8 HOURS
Status: DISCONTINUED | OUTPATIENT
Start: 2018-12-06 | End: 2018-12-08 | Stop reason: HOSPADM

## 2018-12-06 RX ORDER — MISOPROSTOL 200 UG/1
800 TABLET ORAL ONCE
Status: COMPLETED | OUTPATIENT
Start: 2018-12-06 | End: 2018-12-06

## 2018-12-06 RX ORDER — DEXAMETHASONE SODIUM PHOSPHATE 4 MG/ML
INJECTION, SOLUTION INTRA-ARTICULAR; INTRALESIONAL; INTRAMUSCULAR; INTRAVENOUS; SOFT TISSUE AS NEEDED
Status: DISCONTINUED | OUTPATIENT
Start: 2018-12-06 | End: 2018-12-06 | Stop reason: HOSPADM

## 2018-12-06 RX ORDER — ONDANSETRON 2 MG/ML
INJECTION INTRAMUSCULAR; INTRAVENOUS AS NEEDED
Status: DISCONTINUED | OUTPATIENT
Start: 2018-12-06 | End: 2018-12-06 | Stop reason: HOSPADM

## 2018-12-06 RX ADMIN — OXYTOCIN 30 UNITS: 10 INJECTION, SOLUTION INTRAMUSCULAR; INTRAVENOUS at 20:24

## 2018-12-06 RX ADMIN — SODIUM CHLORIDE, SODIUM LACTATE, POTASSIUM CHLORIDE, AND CALCIUM CHLORIDE 999 ML/HR: 600; 310; 30; 20 INJECTION, SOLUTION INTRAVENOUS at 19:48

## 2018-12-06 RX ADMIN — DEXAMETHASONE SODIUM PHOSPHATE 8 MG: 4 INJECTION, SOLUTION INTRA-ARTICULAR; INTRALESIONAL; INTRAMUSCULAR; INTRAVENOUS; SOFT TISSUE at 20:29

## 2018-12-06 RX ADMIN — MORPHINE SULFATE 2.5 MG: 0.5 INJECTION, SOLUTION EPIDURAL; INTRATHECAL; INTRAVENOUS at 20:34

## 2018-12-06 RX ADMIN — LIDOCAINE HYDROCHLORIDE 10 ML: 20 INJECTION, SOLUTION EPIDURAL; INFILTRATION; INTRACAUDAL; PERINEURAL at 19:53

## 2018-12-06 RX ADMIN — HYDROMORPHONE HYDROCHLORIDE 0.5 MG: 2 INJECTION, SOLUTION INTRAMUSCULAR; INTRAVENOUS; SUBCUTANEOUS at 23:57

## 2018-12-06 RX ADMIN — MISOPROSTOL 800 MCG: 200 TABLET ORAL at 22:00

## 2018-12-06 RX ADMIN — Medication 10 ML/HR: at 17:59

## 2018-12-06 RX ADMIN — MORPHINE SULFATE 2.5 MG: 0.5 INJECTION, SOLUTION EPIDURAL; INTRATHECAL; INTRAVENOUS at 20:35

## 2018-12-06 RX ADMIN — Medication 10 ML/HR: at 09:21

## 2018-12-06 RX ADMIN — Medication 2500 MILLI-UNITS/HR: at 21:50

## 2018-12-06 RX ADMIN — CEFAZOLIN SODIUM 2 G: 1 INJECTION, POWDER, FOR SOLUTION INTRAMUSCULAR; INTRAVENOUS at 20:07

## 2018-12-06 RX ADMIN — SODIUM CHLORIDE, SODIUM LACTATE, POTASSIUM CHLORIDE, AND CALCIUM CHLORIDE 125 ML/HR: 600; 310; 30; 20 INJECTION, SOLUTION INTRAVENOUS at 05:05

## 2018-12-06 RX ADMIN — EPHEDRINE SULFATE 10 MG: 50 INJECTION, SOLUTION INTRAVENOUS at 20:29

## 2018-12-06 RX ADMIN — PROPOFOL 180 MG: 10 INJECTION, EMULSION INTRAVENOUS at 20:20

## 2018-12-06 RX ADMIN — BUPIVACAINE HYDROCHLORIDE 10 ML: 2.5 INJECTION, SOLUTION EPIDURAL; INFILTRATION; INTRACAUDAL at 09:11

## 2018-12-06 RX ADMIN — KETOROLAC TROMETHAMINE 30 MG: 30 INJECTION, SOLUTION INTRAMUSCULAR; INTRAVENOUS at 20:50

## 2018-12-06 RX ADMIN — Medication 2 G: at 19:52

## 2018-12-06 RX ADMIN — SODIUM CHLORIDE, SODIUM LACTATE, POTASSIUM CHLORIDE, AND CALCIUM CHLORIDE 999 ML/HR: 600; 310; 30; 20 INJECTION, SOLUTION INTRAVENOUS at 08:41

## 2018-12-06 RX ADMIN — EPHEDRINE SULFATE 10 MG: 50 INJECTION, SOLUTION INTRAVENOUS at 09:49

## 2018-12-06 RX ADMIN — OXYTOCIN-SODIUM CHLORIDE 0.9% IV SOLN 30 UNIT/500ML 2 MILLI-UNITS/MIN: 30-0.9/5 SOLUTION at 05:05

## 2018-12-06 RX ADMIN — ONDANSETRON 4 MG: 2 INJECTION INTRAMUSCULAR; INTRAVENOUS at 20:05

## 2018-12-06 RX ADMIN — LIDOCAINE HYDROCHLORIDE 5 ML: 20 INJECTION, SOLUTION EPIDURAL; INFILTRATION; INTRACAUDAL; PERINEURAL at 20:14

## 2018-12-06 RX ADMIN — LIDOCAINE HYDROCHLORIDE 5 ML: 20 INJECTION, SOLUTION EPIDURAL; INFILTRATION; INTRACAUDAL; PERINEURAL at 20:07

## 2018-12-06 RX ADMIN — FENTANYL CITRATE 100 MCG: 50 INJECTION, SOLUTION INTRAMUSCULAR; INTRAVENOUS at 20:30

## 2018-12-06 RX ADMIN — LIDOCAINE HYDROCHLORIDE AND EPINEPHRINE 4.5 ML: 15; 5 INJECTION, SOLUTION EPIDURAL at 09:21

## 2018-12-06 RX ADMIN — SUCCINYLCHOLINE CHLORIDE 100 MG: 20 INJECTION INTRAMUSCULAR; INTRAVENOUS at 20:20

## 2018-12-06 RX ADMIN — SODIUM CHLORIDE, SODIUM LACTATE, POTASSIUM CHLORIDE, AND CALCIUM CHLORIDE 125 ML/HR: 600; 310; 30; 20 INJECTION, SOLUTION INTRAVENOUS at 12:29

## 2018-12-06 RX ADMIN — LIDOCAINE HYDROCHLORIDE 5 ML: 20 INJECTION, SOLUTION EPIDURAL; INFILTRATION; INTRACAUDAL; PERINEURAL at 19:59

## 2018-12-06 NOTE — ANESTHESIA PREPROCEDURE EVALUATION
Anesthetic History No history of anesthetic complications Review of Systems / Medical History Patient summary reviewed and pertinent labs reviewed Pulmonary Within defined limits Neuro/Psych Within defined limits Cardiovascular Hypertension Exercise tolerance: >4 METS 
  
GI/Hepatic/Renal 
Within defined limits Endo/Other Within defined limits Other Findings Physical Exam 
 
Airway TM Distance: 4 - 6 cm Neck ROM: normal range of motion Mouth opening: Normal 
 
 Cardiovascular Rhythm: regular Rate: normal 
 
 
 
 Dental 
 
 
  
Pulmonary Abdominal 
 
 
 
 Other Findings Anesthetic Plan ASA: 2 Anesthesia type: epidural 
 
 
 
 
 
Anesthetic plan and risks discussed with: Patient

## 2018-12-06 NOTE — PROGRESS NOTES
Labor Note Spero Babinski [id number] 
1985  39w1d 
 
 
S:  Feeling comfortable with epidural 
 
O:   
Visit Vitals /64 Pulse 92 Temp 98.7 °F (37.1 °C) Resp 16 Ht 5' 3\" (1.6 m) Wt 63.5 kg (140 lb) SpO2 98% Breastfeeding? No  
BMI 24.80 kg/m² Cervical Exam 
Dilation (cm): 4(4-5) Eff: 50 % Station: -2 Vaginal exam done by? : Cameron Staff Baby Position: Vertex Membrane Status: AROM Patient Vitals for the past 4 hrs: Mode Fetal Heart Rate Variability Decelerations Accelerations RN Reviewed Strip? FHR Interventions 18 1200 External 120 6-25 BPM None No Yes   
18 1145 External 120    Yes   
18 1130 External 120 6-25 BPM None Yes Yes   
18 1115 External 120 6-25 BPM None Yes Yes   
18 1100 External 120 6-25 BPM None Yes Yes   
18 1045 External 120    Yes   
18 1030 External 115 6-25 BPM None Yes Yes   
18 1015 External 120    Yes   
18 1000 External 115 6-25 BPM (!) Late Yes Yes   
18 0957       Lateral Left  
18 0949       IV Fluid Bolus; Lateral Right;Oxygen; Other (comment) 18 0945 External 120    Yes   
18 0930 External 120 6-25 BPM None Yes Yes   
18 0915 US Readjusted; External 120    Yes   
18 0900 External 120 6-25 BPM None Yes Yes  A/P:  29 y.o.  @ 39w1d - labor 1. CEFM/[place] 
2. GBS neg / Rh pos 3. Pitocin 14 
4. Pain control - epidural 
5. Valentine 4-6 hours, or prn. Expect . Antonio Lee MD 
[facility]

## 2018-12-06 NOTE — PROGRESS NOTES
Labor Progress Note Patient seen, fetal heart rate and contraction pattern evaluated, patient examined. Visit Vitals /83 (BP 1 Location: Right arm, BP Patient Position: At rest) Pulse 73 Temp 98.1 °F (36.7 °C) Resp 16 Ht 5' 3\" (1.6 m) Wt 63.5 kg (140 lb) SpO2 100% Breastfeeding? No  
BMI 24.80 kg/m² Physical Exam: 
Cervical Exam:  1 cm dilated Membranes:  Intact Uterine Activity: None Fetal Heart Rate: Reactive Assessment/Plan: Douglas bulb placed without difficulty 60 cc fluid placed

## 2018-12-06 NOTE — PROGRESS NOTES
1900: Bedside and Verbal shift change report given to 2520 TAMMY Gomez (oncoming nurse) by Butch Sams RN (offgoing nurse). Report included the following information SBAR, Kardex, Intake/Output, MAR, Accordion, Recent Results and Med Rec Status. Assumed care of pt at this time. RN will return to assess vitals and assessment. Pt verbalizes understanding and agrees. 2109: Call placed to MD regarding pt oxytocin orders. Rickey Garcia. Begin oxytocin at 0500 12/6. Pt may have 1000mg tylenol and 5mg Ambien. 6770: Pt calls to RN station. Douglas bulb out and intact. 80: RN at bedside to wake pt for shower before beginning oxytocin. Pt and FOB observed awake. VSS, assessment performed. Pt in shower. Linens changed while pt in shower. Pt to call nurses station when ready to be placed on EFM. 0505: RN at bedside to begin oxytocin administration. RN and pt discuss 2 support people at bedside during delivery. RN educates pt that there are no policies regarding visitors, however the RN and MD reserve the right to ask visitors to leave. Pt and FOB verbalize understanding and agree. Pt has no questions at this time. 9639: Bedside and Verbal shift change report given to [id number] (oncoming nurse) by Elijah Severance RN (offgoing nurse). Report included the following information SBAR, Kardex, Intake/Output, MAR, Accordion, Recent Results and Med Rec Status.

## 2018-12-06 NOTE — H&P
History & Physical 
 
Name: Ashley Adams MRN: [id number]  SSN: [id number] YOB: 1985  Age: 29 y.o. Sex: female Subjective: Induction Estimated Date of Delivery: 18 OB History  Para Term  AB Living 2       1 SAB TAB Ectopic Molar Multiple Live Births 1 Ms. [place] is admitted with pregnancy at 39w1d for induction of labor due to hypertension. Chronic hypertension- on no medications. Has no other complaints. Prenatal course was complicated by bilobed placenta and vilamentous cord insertion. Please see prenatal records for details. Past Medical History:  
Diagnosis Date  Bilobed placenta 2018  Essential hypertension CHTN never been medicated  Herpes simplex virus (HSV) infection   
 valtrex prophylaxis  Marginal insertion of umbilical cord affecting management of mother 2018  Psychiatric problem   
 anxiety and depression as a teenager- was medicated; nothing currently and has not been medicated in ~18 years Past Surgical History:  
Procedure Laterality Date  HX TUMOR REMOVAL   Social History Occupational History  Not on file Tobacco Use  Smoking status: Former Smoker  Smokeless tobacco: Never Used Substance and Sexual Activity  Alcohol use: No  
  Alcohol/week: 0.0 oz Frequency: Never  Drug use: No  
 Sexual activity: Yes  
  Partners: Male Birth control/protection: Pill Family History Problem Relation Age of Onset  Cancer Maternal Grandfather  Elevated Lipids Mother No Known Allergies Prior to Admission medications Medication Sig Start Date End Date Taking? Authorizing Provider  
[id number] windy-folic-dha (PRENATAL DHA+COMPLETE PRENATAL) [id number] mg-mcg-mg cmpk Take  by mouth. Yes Provider, Historical  
BABY ASPIRIN PO Take  by mouth.    Yes Provider, Historical  
doxylamine succinate (UNISOM) 25 mg tablet Take 25 mg by mouth nightly as needed for Sleep. Yes Provider, Historical  
acetaminophen (TYLENOL) 325 mg tablet Take 650 mg by mouth every six (6) hours as needed for Pain. Yes Provider, Historical  
raNITIdine (ZANTAC 75) 75 mg tab Take 75 mg by mouth two (2) times a day. Yes Provider, Historical  
valACYclovir (VALTREX) 1 gram tablet Take 1,000 mg by mouth. Yes Provider, Historical  
norgestimate-ethinyl estradiol (ORTHO TRI-CYCLEN, TRI-SPRINTEC) 0.18/0.215/0.25 mg-35 mcg (28) tab TAKE ONE TABLET BY MOUTH ONE TIME DAILY (SKIP LAST 7 DAYS AND START NEW PACK) 10/15/16   Provider, Historical  
  
 
Review of Systems: Pertinent items are noted in the History of Present Illness. Objective:  
 
Vitals: 
Vitals:  
 12/05/18 2207 12/06/18 0402 12/06/18 5206 12/06/18 1481 BP: 122/63 136/83  140/81 Pulse: 97 73  78 Resp:  16  16 Temp:  98.1 °F (36.7 °C)  97.9 °F (36.6 °C) SpO2:   100% Weight:      
Height:      
  
 
Physical Exam: 
Patient without distress. Heart: normal peripheral perfusion Lung: normal respiratory effort Abdomen: soft, nontender Cervical Exam: 4 cm dilated 50% effaced   
-2 station Lower Extremities:  - Edema No 
EFW 7# Membranes:  Artificial Rupture of Membranes; Amniotic Fluid: small amount of clear fluid Fetal Heart Rate: Reactive Prenatal Labs:  
Lab Results Component Value Date/Time  
 Rubella, External Immune 05/12/2018 GrBStrep, External negative 11/14/2018 HBsAg, External Negative 05/12/2018 HIV, External Non-reactive 05/12/2018 RPR, External Non-reactive 05/12/2018 Chlamydia, External Negative 05/12/2018 Impression/Plan:  
 
Plan: Admit for induction of labor. Group B Strep negative. - sp rodriguez - pitocin 10 
- arom of clear fluid - epidural as needed 
- consents signed, questions answered Signed By:  Lovely Pichardo MD   
 December 6, 2018

## 2018-12-06 NOTE — ANESTHESIA PROCEDURE NOTES
Epidural Block Start time: 12/6/2018 9:00 AM 
End time: 12/6/2018 9:16 AM 
Performed by: Rudene Babinski, MD 
Authorized by: Rudene Babinski, MD  
 
Pre-Procedure Indication: at surgeon's request and labor epidural   
Preanesthetic Checklist: patient identified, risks and benefits discussed, anesthesia consent, patient being monitored, timeout performed and anesthesia consent Epidural:  
Patient position:  Seated Prep region:  Lumbar Prep: Betadine Location:  L2-3 Needle and Epidural Catheter:  
Needle Type:  Tuohy Needle Gauge:  17 G Injection Technique:  Loss of resistance using air Attempts:  1 Catheter Size:  18 G Events: no blood with aspiration, no cerebrospinal fluid with aspiration, no paresthesia and negative aspiration test   
Test Dose:  Lidocaine 1.5% w/ epi and negative Assessment:  
Catheter Secured:  Tegaderm Insertion:  Uncomplicated Patient tolerance:  Patient tolerated the procedure well with no immediate complications

## 2018-12-06 NOTE — PROGRESS NOTES
Labor Note Ashley Vanderbilt [id number] 
1985  39w1d 
 
 
S:  Feeling some pressure with contractions. O:   
Visit Vitals /71 Pulse 86 Temp 98.8 °F (37.1 °C) Resp 16 Ht 5' 3\" (1.6 m) Wt 63.5 kg (140 lb) SpO2 100% Breastfeeding? No  
BMI 24.80 kg/m² Cervical Exam 
Dilation (cm): 5 Eff: 50 % Station: -2 Vaginal exam done by? : Rubens Viramontes Baby Position: Vertex Membrane Status: AROM Patient Vitals for the past 4 hrs: Mode Fetal Heart Rate Variability Decelerations Accelerations RN Reviewed Strip?  
18 1530 External 110 6-25 BPM None Yes Yes  
18 1515 External 110    Yes  
18 1500 External 115 6-25 BPM None Yes Yes  
18 1445 External 115    Yes  
18 1430 External 115 6-25 BPM None Yes Yes  
18 1415 External 115    Yes  
18 1400 External 115 6-25 BPM None Yes Yes  
18 1345 External 115    Yes  
18 1330 External 115 6-25 BPM None Yes Yes  
18 1315 External 115    Yes A/P:  29 y.o.  @ 39w1d - labor 1. CEFM/[place] 
2. GBS neg / Rh pos 3. Pitocin 10 
4. Pain control - epidural 
5. Valentine 2 hours, or prn. Expect . Guerda Gordon MD 
[facility]

## 2018-12-06 NOTE — PROGRESS NOTES
4601 verbal and bedside report received from JUANA Lindo RN. Patient care assumed at this time. 0503 Morning assessment completed. No complaints. States contractions are beginning to increase in strength but overall doing well.  
 
0759 Patient states contractions are getting more intense. 9048 Dr. Sofya Pollock at bedside for SVE and AROM. 4/50/-2 and AROM of small amount of blood tinged fluid noted. Patient wants to begin bolus for epidural.  
 
[id number] Bolus initiated for epidural placement. 5111 Requested epidural anesthesia orders from Dr. Kendra Dennis. 2731 Assisted patient to bathroom. 2750 Paged Dr. Kendra Dennis to the bedside place epidural.  
 
0900 Dr. Kendra Dennis at bedside to place epidural.  
 
0930 RN remaining at bedside to monitor BP. Patient states she feels great. Will return shortly to insert rodriguez catheter. 0891 RN at bedside to insert catheter. Patient states she suddenly does not feel good and feels very nauseated. BP checked. 69/41. RN called to nurses station for ephedrine to be brought to bedside and some assistance. Patient immediately turned into right lateral, IV bolus, oxygen mask applied, and laid back out of semi fowlers position. 5612 10 of ephedrine given. Patient remaining nauseated and hot feeling.  
 
0957 Requested Dr. Kendra Dennis at the bedside since [facility] still responding with late decelerations due to BP. BP up some since administration of ephedrine but not back to baseline. Patient turned into left lateral. Bolus still going and oxygen still on. MD arrived at bedside. May give additional dose of ephedrine if necessary for BP.  
 
1000 Will hold off on giving additional dose of ephedrine since BP has come up. FHR stable and late decelerations resolving. 1005 Rodriguez catheter insertion performed by RN. 1100 verbal SBAR given to ROSALEE Lombardi RN. Patient care temporarily given at this time. 1145 Patient care resumed. 1229 Patient doing well. Comfortable with epidural still. 12 [facility] Dr. Rubens Viramontes at bedside for SVE. No change made from previous exam. IUPC placed by MD. MVUs to be 200-220.  
 
1335 Patient doing well. Family at bedside. No complaints. 1426 Patient states she is beginning to feel some contraction pain on her right side. Encouraged patient to reposition patient to right side. [address] noted to be 300. Decreased pitocin from 14 to 8. Patient comfortable with repositioning. [address] now not adequate after decreasing pitocin. Pitocin increased. 1625 Patient doing well. No complaints. Family at bedside. 80 Dr. Rubens Viramontes at bedside for SVE. Patient remains unchanged. Patient asking if she can just have a  section now. MD states she would like to give the patient a few more hours to determine if she can make change. Patient agreeable to plan. 1759 Patient doing well. States she feels some pain in her vagina area but overall doing well. 1830 Patient having vaginal pressure. 1900 Bedside and Verbal shift change report given to JUANA Martin (oncoming nurse) by Christopher Ann RN (offgoing nurse). Report included the following information SBAR, Kardex, Intake/Output, MAR, Accordion, Recent Results and Med Rec Status.

## 2018-12-07 LAB
BASOPHILS # BLD: 0 K/UL (ref 0–0.1)
BASOPHILS NFR BLD: 0 % (ref 0–1)
DIFFERENTIAL METHOD BLD: ABNORMAL
EOSINOPHIL # BLD: 0 K/UL (ref 0–0.4)
EOSINOPHIL NFR BLD: 0 % (ref 0–7)
ERYTHROCYTE [DISTWIDTH] IN BLOOD BY AUTOMATED COUNT: 12.7 % (ref 11.5–14.5)
HCT VFR BLD AUTO: 34 % (ref 35–47)
HGB BLD-MCNC: 11.7 G/DL (ref 11.5–16)
IMM GRANULOCYTES # BLD: 0.3 K/UL (ref 0–0.04)
IMM GRANULOCYTES NFR BLD AUTO: 1 % (ref 0–0.5)
LYMPHOCYTES # BLD: 0.8 K/UL (ref 0.8–3.5)
LYMPHOCYTES NFR BLD: 3 % (ref 12–49)
MCH RBC QN AUTO: 33 PG (ref 26–34)
MCHC RBC AUTO-ENTMCNC: 34.4 G/DL (ref 30–36.5)
MCV RBC AUTO: 95.8 FL (ref 80–99)
MONOCYTES # BLD: 1.1 K/UL (ref 0–1)
MONOCYTES NFR BLD: 4 % (ref 5–13)
NEUTS SEG # BLD: 26 K/UL (ref 1.8–8)
NEUTS SEG NFR BLD: 92 % (ref 32–75)
NRBC # BLD: 0 K/UL (ref 0–0.01)
NRBC BLD-RTO: 0 PER 100 WBC
PLATELET # BLD AUTO: 216 K/UL (ref 150–400)
PMV BLD AUTO: 11.8 FL (ref 8.9–12.9)
RBC # BLD AUTO: 3.55 M/UL (ref 3.8–5.2)
RBC MORPH BLD: ABNORMAL
WBC # BLD AUTO: 28.2 K/UL (ref 3.6–11)

## 2018-12-07 PROCEDURE — 36415 COLL VENOUS BLD VENIPUNCTURE: CPT

## 2018-12-07 PROCEDURE — 77030011943

## 2018-12-07 PROCEDURE — 74011250636 HC RX REV CODE- 250/636: Performed by: OBSTETRICS & GYNECOLOGY

## 2018-12-07 PROCEDURE — 74011250637 HC RX REV CODE- 250/637: Performed by: OBSTETRICS & GYNECOLOGY

## 2018-12-07 PROCEDURE — 85025 COMPLETE CBC W/AUTO DIFF WBC: CPT

## 2018-12-07 PROCEDURE — 65270000029 HC RM PRIVATE

## 2018-12-07 PROCEDURE — 74011250636 HC RX REV CODE- 250/636: Performed by: ANESTHESIOLOGY

## 2018-12-07 RX ADMIN — SODIUM CHLORIDE, SODIUM LACTATE, POTASSIUM CHLORIDE, AND CALCIUM CHLORIDE 125 ML/HR: 600; 310; 30; 20 INJECTION, SOLUTION INTRAVENOUS at 06:17

## 2018-12-07 RX ADMIN — IBUPROFEN 800 MG: 800 TABLET ORAL at 15:17

## 2018-12-07 RX ADMIN — DOCUSATE SODIUM 100 MG: 100 CAPSULE, LIQUID FILLED ORAL at 08:41

## 2018-12-07 RX ADMIN — KETOROLAC TROMETHAMINE 30 MG: 30 INJECTION, SOLUTION INTRAMUSCULAR at 08:41

## 2018-12-07 RX ADMIN — Medication 10 ML: at 00:04

## 2018-12-07 RX ADMIN — KETOROLAC TROMETHAMINE 30 MG: 30 INJECTION, SOLUTION INTRAMUSCULAR at 03:07

## 2018-12-07 RX ADMIN — SIMETHICONE CHEW TAB 80 MG 80 MG: 80 TABLET ORAL at 15:17

## 2018-12-07 RX ADMIN — IBUPROFEN 800 MG: 800 TABLET ORAL at 23:05

## 2018-12-07 NOTE — PROGRESS NOTES
Labor Note Panda Neighbor [id number] 
1985  39w1d 
 
 
S:  Feeling comfortable with epidural. 
 
O:   
Visit Vitals BP (!) 145/91 Pulse 75 Temp 98.8 °F (37.1 °C) Resp 16 Ht 5' 3\" (1.6 m) Wt 63.5 kg (140 lb) SpO2 98% Breastfeeding? No  
BMI 24.80 kg/m² Cervical Exam 
Dilation (cm): 5 Eff: 50 % Station: -2 Vaginal exam done by? : Tarry Runner MD  
Baby Position: Vertex Membrane Status: AROM Patient Vitals for the past 4 hrs: Mode Fetal Heart Rate Variability Decelerations Accelerations RN Reviewed Strip?  
18 1900 External 120 6-25 BPM None Yes Yes  
18 1845 External 115    Yes  
18 1830 External 115 6-25 BPM None Yes Yes  
18 1815 External 115    Yes  
18 1800 External 120 6-25 BPM None Yes Yes  
18 1745 External 115    Yes  
18 1730 External 120 6-25 BPM None Yes Yes  
18 1715 External 115    Yes  
18 1700 External 115 6-25 BPM None Yes Yes  
18 1645 External 115    Yes  
18 1630 External 110 6-25 BPM None Yes Yes  
18 1615 External 110    Yes  
18 1600 External 110 6-25 BPM None Yes Yes  
18 1545 External 110    Yes A/P:  29 y.o.  @ 39w1d - labor  
-cervix unchanged; proceed w 1LTCS for APA 
-questions answered, r/b reviewed Timo Reddy MD 
[facility]

## 2018-12-07 NOTE — DISCHARGE INSTRUCTIONS
Discharge Instructions for  Section    Patient ID:  Erma Dalton  [id number]  89 y.o.  1985    Continue taking your prenatal vitamins if you are breastfeeding. Follow-up care is a key part of your treatment and safety. Be sure to keep all your scheduled appointments    Activity  1. Avoid heavy lifting greater than 10lbs for 2 weeks after your surgery  2. Pelvic rest for 6 weeks, ie, nothing in your vagina for 6 weeks  (no intercourse, tampons, or douching). No tubs for 6 weeks. 3. No driving for 2 weeks and until you are no longer taking prescription pain medications and you can put your foot on the break in a hurry   4. Limit climbing stairs to only when necessary the first 1-2 weeks. Hold the railing and do not carry your baby up and downstairs at first for safety   5. You may walk as tolerated, though be care to not over-do it. Walking will assist in overall healing, decrease constipation and bloating. Elige Gloss feel better more quickly with some daily movement. Diet  Regular diet as tolerated    Wound care  There are several types of incision closures. 1. If you have metal staples in place when you leave the hospital, please call your doctors office to schedule the staple removal as directed at discharge. 2. If you have steri strips covering your incision, you may remove them as they fall off or be sure to remove them about one week after your surgery. Removing them in the shower may be easier. 3. If you have Dermabond, or skin glue, covering your incision, it will fall off slowly in the next few weeks. You may remove it as it begins to peel off. Additional wound care  1. Clear or reddish drainage from your incision is normal.  It's best to leave it open to the air, but if there is drainage, you may cover the incision lightly with gauze, preferably without tape. 2.  Keep the incision clean and dry.     3. Numbness of the skin at or around your incision is normal and the feeling usually returns gradually. 4. Call your doctor if you have increasing drainage from your incision, an unpleasant odor, red streaks, an increase in pain, or if it appears to open. Pain Management  1. Continue prescription pain medication as written by discharging physician  2. Over the counter medications such as Tylenol and ibuprofen (Motrin or Advil) are also ideal.  These may be taken together or in alternating doses. You may  take the maximum dose:  Motrin or Advil (generic ibuprofen), either 3 tablets every 6 hours or 4 tablets every 8 hours. Your prescription medication conntains a narcotic mixed with Tylenol,so  you should not take any extra Tylenol or acetaminophen until you have reduced your prescription pain medication. The maximum dose is Tylenol or acetominophen 1000 mg every 6 hours (equivalent to 2 Extra Strength Tylenols every 6 hours). 3. After a few days, begin to replace the prescription medication with over the counter medications. Use the prescription medication if needed for more severe pain or at night. The prescription medication can be addictive if overused. 4. Add heating pad or sitz baths as needed. Constipation  1. Constipation is normal after surgery, especially while taking prescription narcotic pain medication. 2. Over the counter remedies including ducosate (Colace), take 1-2 capsules 1-2 times daily for soft stool as needed. You may also add/ try milk of magnesia or rectal remedies such as Dulcolax or Fleets enema. Recovery: What to Expect at Home  1. Fatigue is expected. Try to rest when you can and don't worry about doing housework or other tasks which can wait. 2. The soreness in your incision will improve significantly over the first 2 weeks, but it may take 6-8 weeks before you are completely recovered. 3. Back pain or general body aches or muscle soreness are expected and should improve with acetominophen or ibuprofen.   4. Leg swelling due to pregnancy and/or IV fluids given in the hospital will take about two weeks to resolve. 5. Most women experience some form of the \"Baby Blues\" after having a baby. Feeling emotional, tearful, frustrated, anxious, sad, and irritable some of the time is normal and go away after about 2 weeks. Adequate rest and help from your family will help. Take breaks from caring for the baby. Call your doctor if your symptoms seem severe, last more than 2 weeks, or seem to be getting worse instead of better. Get help immediately if you have thoughts of wanting to hurt yourself or others! Call your doctor or seek immediate medical care if you have:  Heavy vaginal bleeding, soaking through one or more pads an hour for several hours. Foul-smelling discharge from your vagina or incision. Consistent nausea and vomiting and cannot keep fluids down. Consistent pain that does not get better after you take pain medicine.   Sudden chest pain and shortness of breath  Signs of a blood clot: pain/ swelling/ increasing redness in your lower extremeties  Signs of infection: increased pain in your abdomen or vaginal area; red streaks, warmth, or tenderness of your breasts; fever of 100.5 F or greater

## 2018-12-07 NOTE — PROGRESS NOTES
1900: Bedside and Verbal shift change report given to [address] Chris Gomez (oncoming nurse) by Mahin Curran RN (offgoing nurse). Report included the following information SBAR, Kardex, Intake/Output, MAR, Accordion, Recent Results and Med Rec Status. Assumed care of pt at this time. : RN at bedside to perform bilateral side-lying hip release on pt. Pt and FHT tolerate well. Cory Rizvi MD at bedside to assess progression of labor. : SVE performed. Pt unchanged at this time. MD and pt discuss . Risks and benefits discussed. Pt verbalizes understanding and agrees to . RN gives pt time to speak with family before going back to the [address] [place] [address]: Pt in [facility] [address].  
 
: Pt continues to feel sharp touch. MD, ANE, and pt discuss general anesthesia. Pt agrees to general ANE.  
 
: Pt back in room from PACU. RN and MD discuss pt fundal exams. VORB 800mcg misoprostol (cytotec). : RN at bedside to administer misoprostal. Fundal massage performed after administration. Pt tolerated well.  
 
0125 TRANSFER - OUT REPORT: 
 
Verbal report given to TASIA Jackson RN (name) on Erma Dalton  being transferred to MIU (unit) for routine progression of care Report consisted of patients Situation, Background, Assessment and  
Recommendations(SBAR). Information from the following report(s) SBAR, Kardex, Intake/Output, MAR, Accordion, Recent Results and Med Rec Status was reviewed with the receiving nurse. Lines:  
Peripheral IV 18 Anterior;Right Wrist (Active) Site Assessment Clean, dry, & intact 2018  7:13 PM  
Phlebitis Assessment 0 2018  7:13 PM  
Infiltration Assessment 0 2018  7:13 PM  
Dressing Status Clean, dry, & intact 2018  7:13 PM  
Dressing Type Tape;Transparent 2018  7:13 PM  
Hub Color/Line Status Pink; Infusing;Patent 2018  7:13 PM  
Action Taken Open ports on tubing capped 2018  7:13 PM  
Alcohol Cap Used Yes 2018  7:13 PM  
  
 
 Opportunity for questions and clarification was provided. Patient transported with: 
 Registered Nurse Tech

## 2018-12-07 NOTE — PERIOP NOTES
TRANSFER - OUT REPORT: 
 
Verbal report given to JANIS Acevedo on Daniel Forth  being transferred to L&D for routine post - op Report consisted of patients Situation, Background, Assessment and  
Recommendations(SBAR). Information from the following report(s) SBAR and MAR was reviewed with the receiving nurse. Opportunity for questions and clarification was provided. Patient transported with: 
 Registered Nurse

## 2018-12-07 NOTE — ANESTHESIA POSTPROCEDURE EVALUATION
Procedure(s):  SECTION. Anesthesia Post Evaluation Multimodal analgesia: multimodal analgesia not used between 6 hours prior to anesthesia start to PACU discharge Patient location during evaluation: PACU Patient participation: complete - patient participated Level of consciousness: awake Pain management: adequate Airway patency: patent Anesthetic complications: no 
Cardiovascular status: acceptable, blood pressure returned to baseline and hemodynamically stable Respiratory status: acceptable Hydration status: acceptable Visit Vitals /72 Pulse (!) 113 Temp 37.1 °C (98.7 °F) Resp 15 Ht 5' 3\" (1.6 m) Wt 63.5 kg (140 lb) SpO2 99% Breastfeeding? No  
BMI 24.80 kg/m²

## 2018-12-07 NOTE — OP NOTES
Section Delivery Procedure Note         Name: Panda Mcduffie      Medical Record Number: [id number]      YOB: 1985     Today's Date: 2018      Preoperative Diagnosis: Active phase arrest    Postoperative Diagnosis: Same    Procedure: Low Cervical Transverse Procedure(s):    Surgeon: Timo Reddy MD    Anesthesia: General    Prophylactic Antibiotics: Ancef    Fetal Description: talamantes male    Birth Information:   Information for the patient's :  Sarkis Torres, Male [[id number]]          Umbilical Cord: 3 vessels present    Placenta:  Expressed, velamentous cord insertion    Specimens: none           Complications:  none    Procedure Details: The patient was taken to the operating room, where epidural anesthesia was not found to be adequate so patient required general anesthesia. The patient was placed in a left-tilt position and prepped and draped in the normal sterile fashion, including placement of a rodriguez catheter. A knife was used to incise the skin in a Pfannenstiel fashion and to carry the incision through the subcutaneous tissue. The fascia was knicked in the midline with the knife. The fascial incision was extended bilaterally with shirley scissors. The fascial edges were grasped with Kocher clamps, and blunt, sharp, and cautery dissection used to dissect the fascia from the underlying muscle bellies. The muscle bellies were bluntly divided and pushed aside. The peritoneum was entered bluntly and the peritoneal incision stretched. An Andrew self-retaining retractor was placed. A low transverse uterine incision was made with the scalpel and developed by applying traction in a cephalad and caudal direction. Amniotomy was performed and the fluid was small amount clear. The  head was elevated to the level of the incision and delivered without difficulty. The nose and mouth were bulb suctioned.  The body was delivered, the cord was clamped and cut and the baby was handed off to the waiting  care unit staff. Placenta was then delivered using gentle traction and fundal massage. The uterus was cleared of all clots and debris. The uterine incision was closed with number 0 vicryl in two layers, the first a running locking fashion, the second an imbricating layer. Good hemostasis was confirmed. The uterine incision was again inspected and good hemostasis was again reassured. The natalie retractor was then removed. The muscle bellies were inspected and good hemostasis confirmed. The peritoneum was closed with a 2-0 vicryl in a running fashion. A single mattress stitch of 2-0 vicryl was used to reapproximate the muscle bellies. The fascia was closed with #1 Vicryl in a running fashion. The skin was closed with a 4-0 Monocryl in a running subcuticular fashion. The patient tolerated the procedure well. Sponge, lap, and needle counts were correct times three and the patient and baby were taken to recovery/postpartum room in stable condition.     Signed: Cornelius Cutler MD      2018

## 2018-12-07 NOTE — PROGRESS NOTES
Post-Operative  Day 1 [employer] Information for the patient's :  Terry Shafer, Male [[id number]] , Low Transverse Patient doing well without unusual complaints. Breastfeeding. Not yet ambulating. Douglas out no void yet. Vitals: 
Visit Vitals /77 (BP 1 Location: Left arm, BP Patient Position: At rest) Pulse 71 Temp 98.4 °F (36.9 °C) Resp 16 Ht 5' 3\" (1.6 m) Wt 63.5 kg (140 lb) SpO2 99% Breastfeeding? Unknown BMI 24.80 kg/m² Temp (24hrs), Av.7 °F (37.1 °C), Min:97.8 °F (36.6 °C), Max:99.4 °F (37.4 °C) Last 24hr Input/Output: 
 
Intake/Output Summary (Last 24 hours) at 2018 6429 Last data filed at 2018 9524 Gross per 24 hour Intake  Output 2900 ml Net -2900 ml Exam:   
   Patient without distress. Abdomen:soft, expected mild tenderness, fundus firm @U-2, dressing c/d/i Lower extremities are no tenderness mild with 
edema. Labs:  
Lab Results Component Value Date/Time WBC 28.2 (H) 2018 01:43 AM  
 WBC 10.4 2018 04:32 PM  
 WBC 10.4 2018 10:28 AM  
 WBC 10.5 2016 03:40 PM  
 HGB 11.7 2018 01:43 AM  
 HGB 12.7 2018 04:32 PM  
 HGB 13.4 2018 10:28 AM  
 HGB 13.9 2016 03:40 PM  
 HCT 34.0 (L) 2018 01:43 AM  
 HCT 35.9 2018 04:32 PM  
 HCT 38.3 2018 10:28 AM  
 HCT 40.7 2016 03:40 PM  
 PLATELET 221  01:43 AM  
 PLATELET 800 26 04:32 PM  
 PLATELET 966  10:28 AM  
 PLATELET 795 (H)  03:40 PM  
 Hgb, External 12.6 2018 Hct, External 38.6 2018 Platelet cnt., External 424 2018 Recent Results (from the past 24 hour(s)) CBC WITH AUTOMATED DIFF Collection Time: 18  1:43 AM  
Result Value Ref Range WBC 28.2 (H) 3.6 - 11.0 K/uL  
 RBC 3.55 (L) 3.80 - 5.20 M/uL  
 HGB 11.7 11.5 - 16.0 g/dL HCT 34.0 (L) 35.0 - 47.0 %  MCV 95.8 80.0 - 99.0 FL  
 MCH 33.0 26.0 - 34.0 PG  
 MCHC 34.4 30.0 - 36.5 g/dL  
 RDW 12.7 11.5 - 14.5 % PLATELET 997 253 - 799 K/uL MPV 11.8 8.9 - 12.9 FL  
 NRBC 0.0 0  WBC ABSOLUTE NRBC 0.00 0.00 - 0.01 K/uL NEUTROPHILS 92 (H) 32 - 75 % LYMPHOCYTES 3 (L) 12 - 49 % MONOCYTES 4 (L) 5 - 13 % EOSINOPHILS 0 0 - 7 % BASOPHILS 0 0 - 1 % IMMATURE GRANULOCYTES 1 (H) 0.0 - 0.5 % ABS. NEUTROPHILS 26.0 (H) 1.8 - 8.0 K/UL  
 ABS. LYMPHOCYTES 0.8 0.8 - 3.5 K/UL  
 ABS. MONOCYTES 1.1 (H) 0.0 - 1.0 K/UL  
 ABS. EOSINOPHILS 0.0 0.0 - 0.4 K/UL  
 ABS. BASOPHILS 0.0 0.0 - 0.1 K/UL  
 ABS. IMM. GRANS. 0.3 (H) 0.00 - 0.04 K/UL  
 DF SMEAR SCANNED    
 RBC COMMENTS NORMOCYTIC, NORMOCHROMIC Assessment: Post-Op day 1, stable Plan: 1. Routine post-operative care 2. Male, desires circ 3. Ambulate.

## 2018-12-07 NOTE — DISCHARGE SUMMARY
Obstetrical Discharge Summary     Name: Clarisa Babb MRN: [id number]  SSN: [id number]    YOB: 1985  Age: 29 y.o. Sex: female      Admit Date: 12/5/2018    Discharge Date: 12/9/18     Attending Physician:  Maria Luisa Schuster MD     Delivering Physician:  Luz Maria Schultz MD     * Admission Diagnoses:   IUP @ 39w1d   [facility]      * Discharge Diagnoses:   Delivery of a VMI via LTCS by Luz Maria Schultz MD on 12/6/2018. Apgars were 8 and 9. Additional Diagnoses:   Hospital Problems as of 12/6/2018 Date Reviewed: 11/3/2016          Codes Class Noted - Resolved POA    Labor and delivery, indication for care ICD-10-CM: O75.9  ICD-9-CM: 659.90  12/5/2018 - Present Unknown             Lab Results   Component Value Date/Time    Rubella, External Immune 05/12/2018    GrBStrep, External negative 11/14/2018      There is no immunization history on file for this patient. * Procedures:   LTCS         * Discharge Condition: [facility] Course: Normal hospital course following the delivery. * Disposition: Home    Discharge Medications:   Current Discharge Medication List          * Follow-up Care/Patient Instructions:   Activity: Activity as tolerated  Diet: Regular Diet  Wound Care: As directed      Followup 4 weeks for PP check        Signed By:  Christopher Nicholson MD     December 6, 2018

## 2018-12-07 NOTE — LACTATION NOTE
Problem: Lactation Care Plan Goal: *Infant latching appropriately Outcome: Progressing Towards Goal 
Mom states baby has been nursing well since delivery. Not seen at breast. 
  
Pt will successfully establish breastfeeding by feeding in response to early feeding cues  
or wake every 3h, will obtain deep latch, and will keep log of feedings/output. Taught to BF at hunger cues and or q 2-3 hrs and to offer 10-20 drops of hand expressed colostrum at any non-feeds.   
  
Breast Assessment Left Breast: Medium Left Nipple: Everted, Intact Right Breast: Medium Right Nipple: Everted, Intact Breast- Feeding Assessment Attends Breast-Feeding Classes: Yes Breast-Feeding Experience: No 
Breast Trauma/Surgery: No 
Type/Quality: Good(per mom, not seen at breast) Lactation Consultant Visits Breast-Feedings: Good  
  
  
  
Goal: *Weight loss less than 10% of birth weight Outcome: Progressing Towards Goal 
  
Encouraged mom to attempt feeding with baby led feeding cues. Just as sucking on fingers, rooting, mouthing. Looking for 8-12 feedings in 24 hours. Don't limit baby at breast, allow baby to come of breast on it's own. Baby may want to feed  often and may increase number of feedings on second day of life. Skin to skin encouraged.  
  
 If baby doesn't nurse,  Mom should  hand express  10-20 drops of colostrum and drip into baby's mouth, or give to baby by finger feeding, cup feeding, or spoon feeding at least every 2-3 hours.  
  
  
Problem: Patient Education: Go to Patient Education Activity Goal: Patient/Family Education Outcome: Progressing Towards Goal 
Reviewed breastfeeding basics:  Supply and demand,  stomach size, early  Feeding cues, skin to skin, positioning and baby led latch-on, assymetrical latch with signs of good, deep latch vs shallow, feeding frequency and duration, and log sheet for tracking infant feedings and output. Breastfeeding Booklet and Warm line information given. Discussed typical  weight loss and the importance of infant weight checks with pediatrician 1-2 post discharge. 
  
Hand Expression Education:  Mom taught how to manually hand express her colostrum. Emphasized the importance of providing infant with valuable colostrum as infant rests skin to skin at breast.  Aware to avoid extended periods of non-feeding. Aware to offer 10-20+ drops of colostrum every 2-3 hours until infant is latching and nursing effectively. Taught the rationale behind this low tech but highly effective evidence based practice. Many drops noted. 
  
Discussed with mother her plan for feeding. Reviewed the benefits of exclusive breast milk feeding during the hospital stay. Informed her of the risks of using formula to supplement in the first few days of life as well as the benefits of successful breast milk feeding; referred her to the Breastfeeding booklet about this information. She acknowledges understanding of information reviewed and states that it is her plan to breast feed her infant.   Will support her choice and offer additional information as needed.

## 2018-12-08 VITALS
HEIGHT: 63 IN | OXYGEN SATURATION: 99 % | WEIGHT: 140 LBS | TEMPERATURE: 97.8 F | HEART RATE: 65 BPM | SYSTOLIC BLOOD PRESSURE: 133 MMHG | DIASTOLIC BLOOD PRESSURE: 76 MMHG | RESPIRATION RATE: 16 BRPM | BODY MASS INDEX: 24.8 KG/M2

## 2018-12-08 PROCEDURE — 74011250637 HC RX REV CODE- 250/637: Performed by: OBSTETRICS & GYNECOLOGY

## 2018-12-08 RX ORDER — IBUPROFEN 800 MG/1
800 TABLET ORAL
Qty: 40 TAB | Refills: 1 | Status: SHIPPED | OUTPATIENT
Start: 2018-12-08 | End: 2020-08-11

## 2018-12-08 RX ORDER — OXYCODONE AND ACETAMINOPHEN 5; 325 MG/1; MG/1
1 TABLET ORAL
Qty: 30 TAB | Refills: 0 | Status: SHIPPED | OUTPATIENT
Start: 2018-12-08 | End: 2020-08-11

## 2018-12-08 RX ADMIN — SIMETHICONE CHEW TAB 80 MG 80 MG: 80 TABLET ORAL at 09:41

## 2018-12-08 RX ADMIN — IBUPROFEN 800 MG: 800 TABLET ORAL at 09:41

## 2018-12-08 RX ADMIN — DOCUSATE SODIUM 100 MG: 100 CAPSULE, LIQUID FILLED ORAL at 09:41

## 2018-12-08 NOTE — ROUTINE PROCESS
Discharge instructions reviewed with patient to include signs and symptoms to notify MD, take home prescriptions, and follow up appointments. Discharge instructions signed electronically. Patient verbalized understanding of all teaching and voiced no concerns at this time. Patient discharged home.

## 2018-12-08 NOTE — ROUTINE PROCESS
Bedside and Verbal shift change report given to [facility] RN (oncoming nurse) by Jany Arellano RN (offgoing nurse). Report included the following information SBAR.

## 2018-12-08 NOTE — LACTATION NOTE
Problem: Lactation Care Plan Goal: *Infant latching appropriately Outcome: Resolved/Met Date Met: 12/08/18 Mom states nursing has been going well. Not see at breast today. 
  
Pt will successfully establish breastfeeding by feeding in response to early feeding cues  
or wake every 3h, will obtain deep latch, and will keep log of feedings/output. Taught to BF at hunger cues and or q 2-3 hrs and to offer 10-20 drops of hand expressed colostrum at any non-feeds.   
  
Breast Assessment Left Breast: Medium Left Nipple: Everted, Intact Right Breast: Medium Right Nipple: Everted, Intact Breast- Feeding Assessment Attends Breast-Feeding Classes: Yes Breast-Feeding Experience: No 
Breast Trauma/Surgery: No 
Type/Quality: Good(per mom, not seen at breast today,) Lactation Consultant Visits Breast-Feedings: Good Mother/Infant Observation Mother Observation: Alignment, Holds breast, Breast comfortable, Cramps, Close hold, Recognizes feeding cues Infant Observation: Breast tissue moves, Lips flanged, upper, Opens mouth, Lips flanged, lower, Latches nipple and aereolae(latched to left breast) 
  
  
Goal: *Weight loss less than 10% of birth weight Outcome: Resolved/Met Date Met: 12/08/18 Encouraged mom to attempt feeding with baby led feeding cues. Just as sucking on fingers, rooting, mouthing. Looking for 8-12 feedings in 24 hours. Don't limit baby at breast, allow baby to come of breast on it's own. Baby may want to feed  often and may increase number of feedings on second day of life. Skin to skin encouraged.  
  
If baby doesn't nurse,  Mom should  hand express  10-20 drops of colostrum and drip into baby's mouth, or give to baby by finger feeding, cup feeding, or spoon feeding at least every 2-3 hours. Mom may  Pump and give infant any expressed milk.   If not pumping any milk, mom should contact pediatrician for possible need for supplementation.  
  
  
 Problem: Patient Education: Go to Patient Education Activity Goal: Patient/Family Education Outcome: Resolved/Met Date Met: 12/08/18 Discussed eating a healthy diet. Instructed mother to eat a variety of foods in order to get a well balanced diet. She should consume an extra 300-500 calories per day (more than her non-pregnant requirement.) These extra calories will help provide energy needed for optimal breast milk production. Mother also encouraged to \"drink to thirst\" and it is recommended that she drink fluids such as water and fruit/vegetable juice. Nutritious snacks should be available so that she can eat throughout the day to help satisfy her hunger and maintain a good milk supply. Continue taking your prenatal vitamins as long as you breast feed.  
  
Breast Feeding Discharge Information 
  
Chart shows numerous feedings, void, stool WNL. Discussed Importance of monitoring outputs and feedings on first week of  Breastfeeding. Discussed ways to tell if baby getting enough, ie  Voids and stools, by day 7, baby should have at least  4-6 wet diapers a day, change in color of stool to a seedy yellow, and return to birth wt within 2 weeks with a steady increase after that. .  Follow up with pediatrician visit for weight check in 1-2 days reviewed. Discussed Breast feeding support groups and encouraged to call Warm line number, [phone]  for any breast feeding questions/problems that arise. Please leave a message and let us know what is going on. We will return your call within 24 hours.  
  
Feedings Encouraged mom to attempt feeding with baby led feeding cues. Just as sucking on fingers, rooting, mouthing. Looking for 8-12 feedings in 24 hours. Don't limit baby at breast, allow baby to come of breast on it's own. Baby may want to feed  often and may increase number of feedings on second day of life. Skin to skin encouraged.   In 4-6 weeks, baby may go though a growth spurt and increase feedings for several days to increase your milk supply.  
  
 If baby doesn't nurse,  Mom should Pump and give infant any expressed milk. If not pumping any milk, mom should contact pediatrician for possible need for supplementation. 
  
  
Engorgement Care Guidelines:  Anticipatory guidance shared. Reviewed how milk is made and normal phases of milk production. Taught care of engorged breasts  and how to help decrease engorgement. If mom should experience engorged breast, frequent breastfeeding encouraged, cool packs around breast and motrin or Ibuprofen as ordered by your Doctor. 
  
  
Call your doctor, midwife and/or lactation consultant if: · Baby is having no wet or dirty diapers · Baby has dark colored urine after day 3 
(should be pale yellow to clear) · Baby has dark colored stools after day 4 (should be mustard yellow, with no meconium) · Baby has fewer wet/soiled diapers or nurses less  
frequently than the goals listed here · Mom has symptoms of mastitis  
(sore breast with fever, chills, flu-like aching)

## 2018-12-08 NOTE — PROGRESS NOTES
Post-Operative  Day 2 Sukhdev Henderson Information for the patient's :  Russell Langford, Male [[id number]] , Low Transverse Patient doing well without unusual complaints. Vitals: 
Visit Vitals /76 (BP 1 Location: Right arm, BP Patient Position: At rest) Pulse 65 Temp 97.8 °F (36.6 °C) Resp 16 Ht 5' 3\" (1.6 m) Wt 63.5 kg (140 lb) SpO2 99% Breastfeeding? Unknown BMI 24.80 kg/m² Temp (24hrs), Av.1 °F (36.7 °C), Min:97.8 °F (36.6 °C), Max:98.3 °F (36.8 °C) Exam:   
  Patient without distress. Abdomen: soft, expected tenderness, fundus firm Incision clean, dry and intact Lower extremities are nontender . with/without edema. Labs:  
Lab Results Component Value Date/Time WBC 28.2 (H) 2018 01:43 AM  
 WBC 10.4 2018 04:32 PM  
 WBC 10.4 2018 10:28 AM  
 WBC 10.5 2016 03:40 PM  
 HGB 11.7 2018 01:43 AM  
 HGB 12.7 2018 04:32 PM  
 HGB 13.4 2018 10:28 AM  
 HGB 13.9 2016 03:40 PM  
 HCT 34.0 (L) 2018 01:43 AM  
 HCT 35.9 2018 04:32 PM  
 HCT 38.3 2018 10:28 AM  
 HCT 40.7 2016 03:40 PM  
 PLATELET 004 85/10/7371 01:43 AM  
 PLATELET 865  04:32 PM  
 PLATELET 333  10:28 AM  
 PLATELET 450 (H)  03:40 PM  
 Hgb, External 12.6 2018 Hct, External 38.6 2018 Platelet cnt., External 424 2018 No results found for this or any previous visit (from the past 24 hour(s)). Assessment: Post-Op day 2, doing well Plan: 1. Routine post-operative care 2. Baby boy circ completed 3. O+. 
4.  Pt requests discharge today, ok to discharge, instructions reviewed.  Follow up 4 weeks or prn

## 2020-08-11 ENCOUNTER — VIRTUAL VISIT (OUTPATIENT)
Dept: FAMILY MEDICINE CLINIC | Age: 35
End: 2020-08-11
Payer: COMMERCIAL

## 2020-08-11 ENCOUNTER — HOSPITAL ENCOUNTER (OUTPATIENT)
Dept: LAB | Age: 35
Discharge: HOME | End: 2020-08-11

## 2020-08-11 DIAGNOSIS — Z83.42 FAMILY HISTORY OF HIGH CHOLESTEROL: ICD-10-CM

## 2020-08-11 DIAGNOSIS — I10 ESSENTIAL HYPERTENSION: Primary | ICD-10-CM

## 2020-08-11 DIAGNOSIS — I10 ESSENTIAL HYPERTENSION: ICD-10-CM

## 2020-08-11 LAB
ALBUMIN SERPL-MCNC: 4.2 G/DL (ref 3.5–5)
ALBUMIN/GLOB SERPL: 1.2 {RATIO} (ref 1.1–2.2)
ALP SERPL-CCNC: 65 U/L (ref 45–117)
ALT SERPL-CCNC: 25 U/L (ref 12–78)
ANION GAP SERPL CALC-SCNC: 7 MMOL/L (ref 5–15)
AST SERPL-CCNC: 18 U/L (ref 15–37)
BILIRUB DIRECT SERPL-MCNC: 0.2 MG/DL (ref 0–0.2)
BILIRUB SERPL-MCNC: 0.5 MG/DL (ref 0.2–1)
BUN SERPL-MCNC: 20 MG/DL (ref 6–20)
BUN/CREAT SERPL: 27 (ref 12–20)
CALCIUM SERPL-MCNC: 9.4 MG/DL (ref 8.5–10.1)
CHLORIDE SERPL-SCNC: 104 MMOL/L (ref 97–108)
CHOLEST SERPL-MCNC: 222 MG/DL
CO2 SERPL-SCNC: 26 MMOL/L (ref 21–32)
CREAT SERPL-MCNC: 0.73 MG/DL (ref 0.55–1.02)
GLOBULIN SER CALC-MCNC: 3.4 G/DL (ref 2–4)
GLUCOSE SERPL-MCNC: 97 MG/DL (ref 65–100)
HDLC SERPL-MCNC: 111 MG/DL
HDLC SERPL: 2 {RATIO} (ref 0–5)
LDLC SERPL CALC-MCNC: 84.4 MG/DL (ref 0–100)
LIPID PROFILE,FLP: ABNORMAL
POTASSIUM SERPL-SCNC: 4.7 MMOL/L (ref 3.5–5.1)
PROT SERPL-MCNC: 7.6 G/DL (ref 6.4–8.2)
SODIUM SERPL-SCNC: 137 MMOL/L (ref 136–145)
TRIGL SERPL-MCNC: 133 MG/DL (ref ?–150)
VLDLC SERPL CALC-MCNC: 26.6 MG/DL

## 2020-08-11 PROCEDURE — 99201 PR OFFICE OUTPATIENT NEW 10 MINUTES: CPT | Performed by: FAMILY MEDICINE

## 2020-08-11 RX ORDER — VALACYCLOVIR HYDROCHLORIDE 1 G/1
TABLET, FILM COATED ORAL
COMMUNITY
Start: 2020-05-26 | End: 2021-03-24

## 2020-08-11 RX ORDER — BISMUTH SUBSALICYLATE 262 MG
1 TABLET,CHEWABLE ORAL DAILY
COMMUNITY

## 2020-08-11 RX ORDER — ASCORBIC ACID 250 MG
25 TABLET ORAL
COMMUNITY

## 2020-08-11 NOTE — PROGRESS NOTES
Queen Deborah is a 30 y.o. female who was seen by synchronous (real-time) audio-video technology on 8/11/2020. Assessment & Plan:   Diagnoses and all orders for this visit:    1. Essential hypertension  -     METABOLIC PANEL, BASIC; Future    2. Family history of high cholesterol  -     LIPID PANEL; Future  -     HEPATIC FUNCTION PANEL; Future        Blood pressure controlled  Labs per orders. Continue current plans. Follow-up and Dispositions    · Return in about 6 months (around 2/11/2021) for blood pressure. Reviewed plan of care. Patient has provided input and agrees with goals. CPT Codes 30332-27990 for Established Patients may apply to this Telehealth Visit      Subjective:   Queen Deborah was seen for Establish Care; Hypertension; and Labs (cholesterol )      Patient presents with:  Establish Care  Hypertension  Labs: cholesterol       Queen Deborah is here to follow up on their HTN. This is a chronic problem. The problem occurs constantly and is stable. The symptoms are relieved by lifestyle modifications, which is/are working well    Her mother has severe hypercholesterolemia and she would like hers checked. Review of Systems   Constitutional: Positive for weight loss. No weight gain   Eyes: Negative for blurred vision. Respiratory: Negative for shortness of breath. Cardiovascular: Negative for chest pain and leg swelling. Gastrointestinal: Negative for abdominal pain. Neurological: Negative for dizziness, sensory change, speech change, focal weakness and headaches. Objective:   /86, P 70, T 99.0 F, SpO2 99%  Physical Exam  Constitutional:       General: She is not in acute distress. Appearance: Normal appearance. Neurological:      Mental Status: She is alert and oriented to person, place, and time. Due to this being a TeleHealth evaluation, many elements of the physical examination are unable to be assessed.          We discussed the expected course, resolution and complications of the diagnosis(es) in detail. Medication risks, benefits, costs, interactions, and alternatives were discussed as indicated. I advised her to contact the office if her condition worsens, changes or fails to improve as anticipated. She expressed understanding with the diagnosis(es) and plan. Pursuant to the emergency declaration under the [address] [place] [address] waiver authority and the [employer] Act, this Virtual  Visit was conducted, with patient's consent, to reduce the patient's risk of exposure to COVID-19 and provide continuity of care for an established patient. Services were provided through a video synchronous discussion virtually to substitute for in-person clinic visit.     Merita Nissen, MD

## 2021-03-23 NOTE — PROGRESS NOTES
Subjective:   Aleksey Ennis is a 29 y.o. y.o. female here for her annual routine checkup. She had her routine gyn visit several weeks ago. No LMP recorded. (Menstrual status: IUD). Social History: single partner, contraception - IUD. Pertinent past medical hstory: hypertension, no history of DVT, CAD, DM, liver disease, migraines or smoking. 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 [employer]  Household members:  3, patient, spouse, 3year old  Last dental appointment:   Less than 6 months ago  Last eye exam:  About 2 years ago  Uses seatbelts regularly :  yes  Getting regular exercise:  yes      Patient Active Problem List    Diagnosis Date Noted    Essential hypertension     Psychiatric problem     Herpes simplex virus (HSV) infection     Labor and delivery, indication for care 2018    Family history of high cholesterol 2016     Current Outpatient Medications   Medication Sig Dispense Refill    sertraline (ZOLOFT) 100 mg tablet TAKE 1 TABLET BY MOUTH EVERY DAY WTIH SERTRALINE 25 MG      sertraline (ZOLOFT) 25 mg tablet TAKE ONE TABLET DAILY WITH  MG SERTRALINE TABLET      multivitamin (ONE A DAY) tablet Take 1 Tab by mouth daily.  ascorbic acid, vitamin C, (Vitamin C) 250 mg tablet Take 25 mg by mouth.        No Known Allergies  Past Medical History:   Diagnosis Date    Essential hypertension     CHTN never been medicated    Herpes simplex virus (HSV) infection     valtrex prophylaxis    Psychiatric problem     anxiety and depression as a teenager- was medicated; nothing currently and has not been medicated in ~18 years     Past Surgical History:   Procedure Laterality Date    HX  SECTION  2018    HX TUMOR REMOVAL       Family History   Problem Relation Age of Onset    Cancer Maternal Grandfather     Elevated Lipids Mother     Cancer Mother         lung    No Known Problems Father     No Known Problems Son      Social History     Tobacco Use    Smoking status: Former Smoker    Smokeless tobacco: Never Used   Substance Use Topics    Alcohol use: No     Alcohol/week: 0.0 standard drinks     Frequency: Never        ROS:  Feeling well. No dyspnea or chest pain on exertion. No abdominal pain, change in bowel habits, black or bloody stools. No urinary tract symptoms. GYN ROS: no menses, no abnormal bleeding, pelvic pain or discharge, no breast pain or new or enlarging lumps on self exam. No neurological complaints. Objective:  Visit Vitals  BP (!) 147/83   Pulse 74   Temp 98.1 °F (36.7 °C) (Temporal)   Resp 20   Ht 5' 3\" (1.6 m)   Wt 129 lb (58.5 kg)   SpO2 100%   BMI 22.85 kg/m²     BP Readings from Last 3 Encounters:   03/24/21 (!) 147/83   12/08/18 133/76   11/28/18 137/88       The patient appears well, alert, oriented x 3, in no distress. ENT normal.  Neck supple. No adenopathy or thyromegaly. REDD. Lungs are clear, good air entry, no wheezes, rhonchi or rales. S1 and S2 normal, no murmurs, regular rate and rhythm. Abdomen soft without tenderness, guarding, mass or organomegaly. Extremities show no edema, normal peripheral pulses. Neurological is normal, no focal findings. BREAST EXAM: deferred to gyn    PELVIC EXAM: deferred to gyn    Assessment/Plan:    ICD-10-CM ICD-9-CM    1. Encounter for gynecological examination without abnormal finding  J98.254 Z57.93 METABOLIC PANEL, COMPREHENSIVE      CBC WITH AUTOMATED DIFF   2. Essential hypertension  Y53 893.9 METABOLIC PANEL, COMPREHENSIVE      hydroCHLOROthiazide (HYDRODIURIL) 12.5 mg tablet   3. Need for hepatitis C screening test  Z11.59 V73.89 HEPATITIS C AB, RFLX TO QT BY PCR         Blood pressure elevated  Labs per orders. Start hydrochlorothiazide      Follow-up and Dispositions    · Return in about 6 weeks (around 5/5/2021) for blood pressure. .      Reviewed plan of care. Patient has provided input and agrees with goals.

## 2021-03-24 ENCOUNTER — OFFICE VISIT (OUTPATIENT)
Dept: FAMILY MEDICINE CLINIC | Age: 36
End: 2021-03-24
Payer: COMMERCIAL

## 2021-03-24 VITALS
TEMPERATURE: 98.1 F | WEIGHT: 129 LBS | BODY MASS INDEX: 22.86 KG/M2 | RESPIRATION RATE: 20 BRPM | HEIGHT: 63 IN | HEART RATE: 74 BPM | SYSTOLIC BLOOD PRESSURE: 147 MMHG | OXYGEN SATURATION: 100 % | DIASTOLIC BLOOD PRESSURE: 83 MMHG

## 2021-03-24 DIAGNOSIS — Z11.59 NEED FOR HEPATITIS C SCREENING TEST: ICD-10-CM

## 2021-03-24 DIAGNOSIS — Z01.419 ENCOUNTER FOR GYNECOLOGICAL EXAMINATION WITHOUT ABNORMAL FINDING: Primary | ICD-10-CM

## 2021-03-24 DIAGNOSIS — I10 ESSENTIAL HYPERTENSION: ICD-10-CM

## 2021-03-24 PROCEDURE — 99395 PREV VISIT EST AGE 18-39: CPT | Performed by: FAMILY MEDICINE

## 2021-03-24 RX ORDER — HYDROCHLOROTHIAZIDE 12.5 MG/1
12.5 TABLET ORAL DAILY
Qty: 30 TAB | Refills: 1 | Status: SHIPPED | OUTPATIENT
Start: 2021-03-24 | End: 2021-05-19 | Stop reason: SDUPTHER

## 2021-03-24 RX ORDER — SERTRALINE HYDROCHLORIDE 50 MG/1
TABLET, FILM COATED ORAL
COMMUNITY
Start: 2021-01-19 | End: 2021-03-24

## 2021-03-24 RX ORDER — SERTRALINE HYDROCHLORIDE 25 MG/1
TABLET, FILM COATED ORAL
COMMUNITY
Start: 2021-02-23 | End: 2022-09-11

## 2021-03-24 RX ORDER — SERTRALINE HYDROCHLORIDE 100 MG/1
TABLET, FILM COATED ORAL
COMMUNITY
Start: 2021-02-23 | End: 2022-09-11

## 2021-03-24 NOTE — PATIENT INSTRUCTIONS
DASH Diet: After Your Visit   Your Care Instructions   The DASH diet is an eating plan that can help lower your blood pressure. DASH stands for Dietary Approaches to Stop Hypertension. Hypertension is high blood pressure. The DASH diet focuses on eating foods that are high in calcium, potassium, and magnesium. These nutrients can lower blood pressure. The foods that are highest in these nutrients are fruits, vegetables, low-fat dairy products, nuts, seeds, and legumes. But taking calcium, potassium, and magnesium supplements instead of eating foods that are high in those nutrients does not have the same effect. The DASH diet also includes whole grains, fish, and poultry. The DASH diet is one of several lifestyle changes your doctor may recommend to lower your high blood pressure. Your doctor may also want you to decrease the amount of sodium in your diet. Lowering sodium while following the DASH diet can lower blood pressure even further than just the DASH diet alone. Follow-up care is a key part of your treatment and safety. Be sure to make and go to all appointments, and call your doctor if you are having problems. Its also a good idea to know your test results and keep a list of the medicines you take. How can you care for yourself at home? Following the DASH diet   Eat 4 to 5 servings of fruit each day. A serving is 1 medium-sized piece of fruit, 1/2 cup chopped or canned fruit, 1/4 cup dried fruit, or 4 ounces (1/2 cup) of fruit juice. Choose fruit more often than fruit juice. Eat 4 to 5 servings of vegetables each day. A serving is 1 cup of lettuce or raw leafy vegetables, 1/2 cup of chopped or cooked vegetables, or 4 ounces (1/2 cup) of vegetable juice. Choose vegetables more often than vegetable juice. Get 3 servings of low-fat dairy each day. A serving is 8 ounces of milk, 1 cup of yogurt, or 1 1/2 ounces of cheese. Eat 7 to 8 servings of grains each day.  A serving is 1 slice of bread, 1 ounce of dry cereal, or 1/2 cup of cooked rice, pasta, or cooked cereal. Try to choose whole-grain products as much as possible. Limit lean meat, poultry, and fish to 6 ounces each day. Six ounces is about the size of two decks of cards. Eat 4 to 5 servings of nuts, seeds, and legumes (cooked dried beans, lentils, and split peas) each week. A serving is 1/3 cup of nuts, 2 tablespoons of seeds, or 1/2 cup cooked dried beans or peas. Tips for success   Start small. Do not try to make dramatic changes to your diet all at once. You might feel that you are missing out on your favorite foods and then be more likely to not follow the plan. Make small changes, and stick with them. Once those changes become habit, add a few more changes. Try some of the following:   Make it a goal to eat a fruit or vegetable at every meal and at snacks. This will make it easy to get the recommended amount of fruits and vegetables each day. Try yogurt topped with fruit and nuts for a snack or healthy dessert. Add lettuce, tomato, cucumber, and onion to sandwiches. Combine a ready-made pizza crust with low-fat mozzarella cheese and lots of vegetable toppings. Try using tomatoes, squash, spinach, broccoli, carrots, cauliflower, and onions. Have a variety of cut-up vegetables with a low-fat dip as an appetizer instead of chips and dip. Sprinkle sunflower seeds or chopped almonds over salads. Or try adding chopped walnuts or almonds to cooked vegetables. Try some vegetarian meals using beans and peas. Add garbanzo or kidney beans to salads. Make burritos and tacos with mashed arita beans or black beans. Where can you learn more? Go to [employer]   Enter H967 in the search box to learn more about \"DASH Diet: After Your Visit. \"    © 8373-8846 Healthwise, Incorporated. Care instructions adapted under license by [facility] (which disclaims liability or warranty for this information).  This care instruction is for use with your licensed healthcare professional. If you have questions about a medical condition or this instruction, always ask your healthcare professional. [place] [address] any warranty or liability for your use of this information.    Content Version: 5.6.194757; Last Revised: March 24, 2010

## 2021-04-07 DIAGNOSIS — R17 TOTAL BILIRUBIN, ELEVATED: Primary | ICD-10-CM

## 2021-06-02 ENCOUNTER — OFFICE VISIT (OUTPATIENT)
Dept: FAMILY MEDICINE CLINIC | Age: 36
End: 2021-06-02
Payer: COMMERCIAL

## 2021-06-02 VITALS
DIASTOLIC BLOOD PRESSURE: 81 MMHG | WEIGHT: 133 LBS | RESPIRATION RATE: 20 BRPM | HEIGHT: 63 IN | HEART RATE: 74 BPM | TEMPERATURE: 97.7 F | BODY MASS INDEX: 23.57 KG/M2 | SYSTOLIC BLOOD PRESSURE: 135 MMHG

## 2021-06-02 DIAGNOSIS — I10 ESSENTIAL HYPERTENSION: Primary | ICD-10-CM

## 2021-06-02 DIAGNOSIS — R17 TOTAL BILIRUBIN, ELEVATED: ICD-10-CM

## 2021-06-02 PROCEDURE — 99214 OFFICE O/P EST MOD 30 MIN: CPT | Performed by: FAMILY MEDICINE

## 2021-06-02 RX ORDER — HYDROCHLOROTHIAZIDE 12.5 MG/1
12.5 TABLET ORAL DAILY
Qty: 90 TABLET | Refills: 4 | Status: SHIPPED | OUTPATIENT
Start: 2021-06-02 | End: 2021-12-13 | Stop reason: SDUPTHER

## 2021-06-02 NOTE — PROGRESS NOTES
Chief Complaint   Patient presents with    Hypertension     follow up    [facility]     would like to redo labs

## 2021-06-02 NOTE — PROGRESS NOTES
HISTORY OF PRESENT ILLNESS  Yandel Newberry is a 29 y.o. female. Patient presents with:  Hypertension: follow up   Labs: would like to redo labs     Yandel Newberry is here to follow up on their HTN. This is a chronic problem. She is due to her her liver function tests checked next month for a mildly elevated bilirubin. Review of Systems   Eyes: Negative for blurred vision. Respiratory: Negative for shortness of breath. Cardiovascular: Negative for chest pain. Neurological: Negative for dizziness, sensory change, speech change, focal weakness and headaches. Visit Vitals  /81   Pulse 74   Temp 97.7 °F (36.5 °C) (Temporal)   Resp 20   Ht 5' 3\" (1.6 m)   Wt 133 lb (60.3 kg)   BMI 23.56 kg/m²     BP Readings from Last 3 Encounters:   06/02/21 135/81   03/24/21 (!) 147/83   12/08/18 133/76       Physical Exam  Vitals and nursing note reviewed. Constitutional:       General: She is not in acute distress. Appearance: She is well-developed. She is not diaphoretic. Cardiovascular:      Rate and Rhythm: Normal rate and regular rhythm. Heart sounds: Normal heart sounds. No murmur heard. No friction rub. No gallop. Pulmonary:      Effort: Pulmonary effort is normal. No respiratory distress. Breath sounds: Normal breath sounds. No wheezing or rales. Skin:     General: Skin is warm and dry. Neurological:      Mental Status: She is alert and oriented to person, place, and time. ASSESSMENT and PLAN    ICD-10-CM ICD-9-CM    1. Essential hypertension  J32 998.8 METABOLIC PANEL, BASIC      hydroCHLOROthiazide (HYDRODIURIL) 12.5 mg tablet   2. Total bilirubin, elevated  R17 277.4 HEPATIC FUNCTION PANEL        Blood pressure controlled  Labs per orders in one month  Continue current plans. Refills per orders    Follow-up and Dispositions    · Return in about 6 months (around 12/2/2021) for blood pressure. Reviewed plan of care.   Patient has provided input and agrees with goals.

## 2021-12-13 ENCOUNTER — OFFICE VISIT (OUTPATIENT)
Dept: FAMILY MEDICINE CLINIC | Age: 36
End: 2021-12-13
Payer: COMMERCIAL

## 2021-12-13 VITALS
OXYGEN SATURATION: 99 % | DIASTOLIC BLOOD PRESSURE: 87 MMHG | BODY MASS INDEX: 24.45 KG/M2 | WEIGHT: 138 LBS | SYSTOLIC BLOOD PRESSURE: 134 MMHG | TEMPERATURE: 97.5 F | HEART RATE: 70 BPM | HEIGHT: 63 IN

## 2021-12-13 DIAGNOSIS — I10 ESSENTIAL HYPERTENSION: Primary | ICD-10-CM

## 2021-12-13 PROCEDURE — 99213 OFFICE O/P EST LOW 20 MIN: CPT | Performed by: FAMILY MEDICINE

## 2021-12-13 RX ORDER — HYDROCHLOROTHIAZIDE 12.5 MG/1
12.5 TABLET ORAL DAILY
Qty: 90 TABLET | Refills: 4 | Status: SHIPPED | OUTPATIENT
Start: 2021-12-13

## 2021-12-13 RX ORDER — PROPRANOLOL HYDROCHLORIDE 10 MG/1
TABLET ORAL
COMMUNITY
Start: 2021-12-08 | End: 2022-09-11

## 2021-12-16 ENCOUNTER — TELEPHONE (OUTPATIENT)
Dept: FAMILY MEDICINE CLINIC | Age: 36
End: 2021-12-16

## 2021-12-16 NOTE — TELEPHONE ENCOUNTER
Pt called back to check status of COTA messages sent to Dr Lennox Moros over the last couple of days. Please address when able.

## 2022-03-30 DIAGNOSIS — R17 TOTAL BILIRUBIN, ELEVATED: ICD-10-CM

## 2022-03-30 LAB
ALBUMIN SERPL-MCNC: 4.3 G/DL (ref 3.5–5)
ALBUMIN/GLOB SERPL: 1.2 {RATIO} (ref 1.1–2.2)
ALP SERPL-CCNC: 73 U/L (ref 45–117)
ALT SERPL-CCNC: 22 U/L (ref 12–78)
AST SERPL-CCNC: 21 U/L (ref 15–37)
BILIRUB DIRECT SERPL-MCNC: 0.1 MG/DL (ref 0–0.2)
BILIRUB SERPL-MCNC: 0.4 MG/DL (ref 0.2–1)
GGT SERPL-CCNC: 45 U/L (ref 5–55)
GLOBULIN SER CALC-MCNC: 3.5 G/DL (ref 2–4)
PROT SERPL-MCNC: 7.8 G/DL (ref 6.4–8.2)

## 2022-09-11 ENCOUNTER — HOSPITAL ENCOUNTER (EMERGENCY)
Age: 37
Discharge: HOME | End: 2022-09-11
Attending: EMERGENCY MEDICINE
Payer: COMMERCIAL

## 2022-09-11 VITALS
DIASTOLIC BLOOD PRESSURE: 86 MMHG | HEIGHT: 63 IN | BODY MASS INDEX: 28.32 KG/M2 | RESPIRATION RATE: 16 BRPM | SYSTOLIC BLOOD PRESSURE: 144 MMHG | WEIGHT: 159.83 LBS | OXYGEN SATURATION: 96 % | HEART RATE: 112 BPM | TEMPERATURE: 103.1 F

## 2022-09-11 DIAGNOSIS — J06.9 UPPER RESPIRATORY TRACT INFECTION, UNSPECIFIED TYPE: ICD-10-CM

## 2022-09-11 DIAGNOSIS — R50.9 FEVER, UNSPECIFIED FEVER CAUSE: Primary | ICD-10-CM

## 2022-09-11 LAB
COVID-19 RAPID TEST, COVR: NOT DETECTED
SOURCE, COVRS: NORMAL

## 2022-09-11 PROCEDURE — 99283 EMERGENCY DEPT VISIT LOW MDM: CPT

## 2022-09-11 PROCEDURE — 74011250637 HC RX REV CODE- 250/637: Performed by: EMERGENCY MEDICINE

## 2022-09-11 PROCEDURE — 87635 SARS-COV-2 COVID-19 AMP PRB: CPT

## 2022-09-11 RX ORDER — AZITHROMYCIN 250 MG/1
TABLET, FILM COATED ORAL
Qty: 6 TABLET | Refills: 0 | Status: SHIPPED | OUTPATIENT
Start: 2022-09-11

## 2022-09-11 RX ORDER — ACETAMINOPHEN 500 MG
1000 TABLET ORAL
Status: COMPLETED | OUTPATIENT
Start: 2022-09-11 | End: 2022-09-11

## 2022-09-11 RX ORDER — ACETAMINOPHEN 500 MG
1000 TABLET ORAL
COMMUNITY

## 2022-09-11 RX ADMIN — ACETAMINOPHEN 1000 MG: 500 TABLET ORAL at 18:26

## 2022-09-11 NOTE — ED TRIAGE NOTES
Pt ambulates to treatment area she states that last night she began feeling \"crappy\" with a severe headache and body aches. She took 2 Tylenol last night then again this morning about 0600. She states that by early afternoon she began feeling bad again with chills and body aches. She took her temperature an hour PTA and it was 103.9 so she came in to be evaluated.   Denies any coughs, N/V/D, or SOB with the other symptoms

## 2022-09-11 NOTE — Clinical Note
[address] EMERGENCY DEPT  [address] [facility]  [place] [id number] [phone]    Work/School Note    Date: 9/11/2022    To Whom It May concern:    Kiley Talley was seen and treated today in the emergency room by the following provider(s):  Attending Provider: Iam Richmond MD.      Kiley Talley is excused from work/school on 9/11/2022 through 9/13/2022. She is medically clear to return to work/school on 9/14/2022.          Sincerely,          Gene Mariscal MD

## 2022-09-11 NOTE — ED PROVIDER NOTES
60-year-old female with fever today. No sore throat or ear pain. She has had COVID vaccines and also had COVID 6 months ago. Her main concern today is the fever. The history is provided by the patient. Fever   This is a new problem. The current episode started 12 to 24 hours ago. The problem occurs constantly. The problem has been gradually improving. The maximum temperature noted was 102 - 102.9 F. The temperature was taken using a tympanic thermometer. Associated symptoms include headaches. Pertinent negatives include no chest pain, no vomiting, no congestion, no sore throat, no cough, no shortness of breath, no mental status change, no neck pain and no rash. She has tried acetaminophen for the symptoms. The treatment provided moderate relief. Headache   This is a new problem. The current episode started 12 to 24 hours ago. The problem occurs constantly. The headache is aggravated by fever. The quality of the pain is described as dull. The pain is mild. Associated symptoms include a fever. Pertinent negatives include no shortness of breath, no weakness, no dizziness, no visual change, no nausea and no vomiting. She has tried nothing for the symptoms. The treatment provided moderate relief.       Past Medical History:   Diagnosis Date    Essential hypertension     CHTN never been medicated    Herpes simplex virus (HSV) infection     valtrex prophylaxis    Psychiatric problem     anxiety and depression as a teenager- was medicated; nothing currently and has not been medicated in ~18 years       Past Surgical History:   Procedure Laterality Date    HX  SECTION  2018    HX TUMOR REMOVAL           Family History:   Problem Relation Age of Onset    Cancer Maternal Grandfather     Elevated Lipids Mother     Cancer Mother         lung    No Known Problems Father     No Known Problems Son        Social History     Socioeconomic History    Marital status:      Spouse name: Not on file Number of children: Not on file    Years of education: Not on file    Highest education level: Not on file   Occupational History    Not on file   Tobacco Use    Smoking status: Former    Smokeless tobacco: Never   Vaping Use    Vaping Use: Never used   Substance and Sexual Activity    Alcohol use: No     Alcohol/week: 0.0 standard drinks    Drug use: No    Sexual activity: Yes     Partners: Male     Birth control/protection: I.U.D. Other Topics Concern     Service Not Asked    Blood Transfusions Not Asked    Caffeine Concern Not Asked    Occupational Exposure Not Asked    Hobby Hazards Not Asked    Sleep Concern Not Asked    Stress Concern Not Asked    Weight Concern Not Asked    Special Diet Not Asked    Back Care Not Asked    Exercise Not Asked    Bike Helmet Not Asked    Seat Belt Not Asked    Self-Exams Not Asked   Social History Narrative    Not on file     Social Determinants of Health     Financial Resource Strain: Not on file   Food Insecurity: Not on file   Transportation Needs: Not on file   Physical Activity: Not on file   Stress: Not on file   Social Connections: Not on file   Intimate Partner Violence: Not on file   Housing Stability: Not on file         ALLERGIES: Patient has no known allergies. Review of Systems   Constitutional:  Positive for fever. Negative for chills. HENT:  Negative for congestion, rhinorrhea, sneezing and sore throat. Respiratory:  Negative for cough and shortness of breath. Cardiovascular:  Negative for chest pain. Gastrointestinal:  Negative for abdominal pain, nausea and vomiting. Musculoskeletal:  Negative for back pain, myalgias, neck pain and neck stiffness. Skin:  Negative for rash. Neurological:  Positive for headaches. Negative for dizziness and weakness. All other systems reviewed and are negative.     Vitals:    09/11/22 1812   BP: (!) 144/86   Pulse: (!) 112   Resp: 16   Temp: (!) 103.1 °F (39.5 °C)   SpO2: 96%   Weight: 72.5 kg (159 lb 13.3 oz)   Height: 5' 3\" (1.6 m)            Physical Exam     MDM  Number of Diagnoses or Management Options  Diagnosis management comments: Patient is given Tylenol for fever today in the ER. COVID test is negative.   She will be put on Zithromax antibiotic for fever and upper respiratory infection           Procedures

## 2023-03-05 DIAGNOSIS — I10 ESSENTIAL HYPERTENSION: ICD-10-CM

## 2023-03-08 RX ORDER — HYDROCHLOROTHIAZIDE 12.5 MG/1
TABLET ORAL
Qty: 90 TABLET | Refills: 4 | OUTPATIENT
Start: 2023-03-08

## 2023-03-09 NOTE — TELEPHONE ENCOUNTER
Called pt, and left a voice message, that he/she is due for their follow up appointment, here at [facility]. Advised pt to call the office back to schedule their appointment.

## 2023-03-14 DIAGNOSIS — I10 ESSENTIAL HYPERTENSION: ICD-10-CM

## 2023-03-19 RX ORDER — HYDROCHLOROTHIAZIDE 12.5 MG/1
12.5 TABLET ORAL DAILY
Qty: 90 TABLET | Refills: 0 | Status: SHIPPED | OUTPATIENT
Start: 2023-03-19

## 2023-05-18 RX ORDER — ASCORBIC ACID 250 MG
25 TABLET ORAL
COMMUNITY

## 2023-05-18 RX ORDER — HYDROCHLOROTHIAZIDE 12.5 MG/1
12.5 TABLET ORAL DAILY
COMMUNITY
Start: 2023-03-19 | End: 2023-06-15

## 2023-05-18 RX ORDER — AZITHROMYCIN 250 MG/1
TABLET, FILM COATED ORAL
COMMUNITY
Start: 2022-09-11

## 2023-05-18 RX ORDER — ACETAMINOPHEN 500 MG
1000 TABLET ORAL EVERY 6 HOURS PRN
COMMUNITY

## 2023-08-02 ENCOUNTER — OFFICE VISIT (OUTPATIENT)
Facility: CLINIC | Age: 38
End: 2023-08-02
Payer: COMMERCIAL

## 2023-08-02 VITALS
OXYGEN SATURATION: 99 % | SYSTOLIC BLOOD PRESSURE: 140 MMHG | HEART RATE: 72 BPM | DIASTOLIC BLOOD PRESSURE: 91 MMHG | WEIGHT: 150 LBS | TEMPERATURE: 97.9 F | BODY MASS INDEX: 26.58 KG/M2 | HEIGHT: 63 IN

## 2023-08-02 DIAGNOSIS — I10 ESSENTIAL HYPERTENSION: ICD-10-CM

## 2023-08-02 DIAGNOSIS — E78.00 HYPERCHOLESTEROLEMIA: ICD-10-CM

## 2023-08-02 DIAGNOSIS — Z00.00 ROUTINE GENERAL MEDICAL EXAMINATION AT A HEALTH CARE FACILITY: Primary | ICD-10-CM

## 2023-08-02 PROCEDURE — 3077F SYST BP >= 140 MM HG: CPT | Performed by: FAMILY MEDICINE

## 2023-08-02 PROCEDURE — 3080F DIAST BP >= 90 MM HG: CPT | Performed by: FAMILY MEDICINE

## 2023-08-02 PROCEDURE — 99395 PREV VISIT EST AGE 18-39: CPT | Performed by: FAMILY MEDICINE

## 2023-08-02 NOTE — PROGRESS NOTES
Subjective:   Ger Hill is a 41 y.o. y.o. female here for her annual routine checkup. She will be scheduling for her routine gyn care soon. No LMP recorded. Social History: single partner, contraception - IUD. Pertinent past medical hstory: hypertension, no history of  DVT, CAD, DM, liver disease, migraines or smoking. Health Habits/Lifestyle  Occupation:  director of research with [employer] members:  3, patient, spouse, son  Last dental appointment:   last month  Last eye exam:  last month  Uses seatbelts regularly :  yes  Getting regular exercise:  no      Patient Active Problem List    Diagnosis Date Noted    Psychiatric problem     Essential hypertension     Herpes simplex virus (HSV) infection     Labor and delivery, indication for care 2018    Family history of high cholesterol 2016     Current Outpatient Medications   Medication Sig Dispense Refill    hydroCHLOROthiazide (HYDRODIURIL) 12.5 MG tablet TAKE 1 TABLET BY MOUTH EVERY DAY 90 tablet 0    ascorbic acid (VITAMIN C) 250 MG tablet Take 25 mg by mouth       No current facility-administered medications for this visit. Not on File  Past Medical History:   Diagnosis Date    Essential hypertension     CHTN never been medicated    Herpes simplex virus (HSV) infection     valtrex prophylaxis    Psychiatric problem     anxiety and depression as a teenager- was medicated; nothing currently and has not been medicated in      Past Surgical History:   Procedure Laterality Date     SECTION  2018    TUMOR REMOVAL       Family History   Problem Relation Age of Onset    Cancer Maternal Grandfather     Cancer Mother         lung    No Known Problems Father     No Known Problems Son     Elevated Lipids Mother      Social History     Tobacco Use    Smoking status: Former    Smokeless tobacco: Never   Substance Use Topics    Alcohol use: No     Alcohol/week: 0.0 standard drinks        ROS:  Feeling well.  No dyspnea or chest

## 2023-08-03 DIAGNOSIS — E78.00 HYPERCHOLESTEROLEMIA: ICD-10-CM

## 2023-08-03 DIAGNOSIS — Z00.00 ROUTINE GENERAL MEDICAL EXAMINATION AT A HEALTH CARE FACILITY: ICD-10-CM

## 2023-08-03 DIAGNOSIS — I10 ESSENTIAL HYPERTENSION: ICD-10-CM

## 2023-08-03 LAB
ALBUMIN SERPL-MCNC: 4 G/DL (ref 3.5–5)
ALBUMIN/GLOB SERPL: 1.2 (ref 1.1–2.2)
ALP SERPL-CCNC: 73 U/L (ref 45–117)
ALT SERPL-CCNC: 20 U/L (ref 12–78)
ANION GAP SERPL CALC-SCNC: 6 MMOL/L (ref 5–15)
AST SERPL-CCNC: 16 U/L (ref 15–37)
BASOPHILS # BLD: 0 K/UL (ref 0–0.1)
BASOPHILS NFR BLD: 1 % (ref 0–1)
BILIRUB SERPL-MCNC: 0.4 MG/DL (ref 0.2–1)
BUN SERPL-MCNC: 8 MG/DL (ref 6–20)
BUN/CREAT SERPL: 13 (ref 12–20)
CALCIUM SERPL-MCNC: 9.3 MG/DL (ref 8.5–10.1)
CHLORIDE SERPL-SCNC: 103 MMOL/L (ref 97–108)
CHOLEST SERPL-MCNC: 207 MG/DL
CO2 SERPL-SCNC: 28 MMOL/L (ref 21–32)
CREAT SERPL-MCNC: 0.6 MG/DL (ref 0.55–1.02)
DIFFERENTIAL METHOD BLD: NORMAL
EOSINOPHIL # BLD: 0.2 K/UL (ref 0–0.4)
EOSINOPHIL NFR BLD: 2 % (ref 0–7)
ERYTHROCYTE [DISTWIDTH] IN BLOOD BY AUTOMATED COUNT: 12.2 % (ref 11.5–14.5)
GLOBULIN SER CALC-MCNC: 3.4 G/DL (ref 2–4)
GLUCOSE SERPL-MCNC: 73 MG/DL (ref 65–100)
HCT VFR BLD AUTO: 41.2 % (ref 35–47)
HDLC SERPL-MCNC: 101 MG/DL
HDLC SERPL: 2 (ref 0–5)
HGB BLD-MCNC: 13.8 G/DL (ref 11.5–16)
IMM GRANULOCYTES # BLD AUTO: 0 K/UL (ref 0–0.04)
IMM GRANULOCYTES NFR BLD AUTO: 0 % (ref 0–0.5)
LDLC SERPL CALC-MCNC: 82.4 MG/DL (ref 0–100)
LYMPHOCYTES # BLD: 2.2 K/UL (ref 0.8–3.5)
LYMPHOCYTES NFR BLD: 26 % (ref 12–49)
MCH RBC QN AUTO: 31.1 PG (ref 26–34)
MCHC RBC AUTO-ENTMCNC: 33.5 G/DL (ref 30–36.5)
MCV RBC AUTO: 92.8 FL (ref 80–99)
MONOCYTES # BLD: 0.7 K/UL (ref 0–1)
MONOCYTES NFR BLD: 8 % (ref 5–13)
NEUTS SEG # BLD: 5.4 K/UL (ref 1.8–8)
NEUTS SEG NFR BLD: 63 % (ref 32–75)
NRBC # BLD: 0 K/UL (ref 0–0.01)
NRBC BLD-RTO: 0 PER 100 WBC
PLATELET # BLD AUTO: 351 K/UL (ref 150–400)
PMV BLD AUTO: 9.9 FL (ref 8.9–12.9)
POTASSIUM SERPL-SCNC: 3.8 MMOL/L (ref 3.5–5.1)
PROT SERPL-MCNC: 7.4 G/DL (ref 6.4–8.2)
RBC # BLD AUTO: 4.44 M/UL (ref 3.8–5.2)
SODIUM SERPL-SCNC: 137 MMOL/L (ref 136–145)
TRIGL SERPL-MCNC: 118 MG/DL
VLDLC SERPL CALC-MCNC: 23.6 MG/DL
WBC # BLD AUTO: 8.5 K/UL (ref 3.6–11)

## 2023-09-17 DIAGNOSIS — I10 ESSENTIAL (PRIMARY) HYPERTENSION: ICD-10-CM

## 2023-09-20 RX ORDER — HYDROCHLOROTHIAZIDE 12.5 MG/1
TABLET ORAL
Qty: 90 TABLET | Refills: 0 | Status: SHIPPED | OUTPATIENT
Start: 2023-09-20

## 2023-10-03 ENCOUNTER — TELEMEDICINE (OUTPATIENT)
Facility: CLINIC | Age: 38
End: 2023-10-03
Payer: COMMERCIAL

## 2023-10-03 DIAGNOSIS — I10 ESSENTIAL (PRIMARY) HYPERTENSION: ICD-10-CM

## 2023-10-03 PROCEDURE — 99213 OFFICE O/P EST LOW 20 MIN: CPT | Performed by: FAMILY MEDICINE

## 2023-10-03 RX ORDER — HYDROCHLOROTHIAZIDE 25 MG/1
25 TABLET ORAL DAILY
Qty: 90 TABLET | Refills: 0 | Status: SHIPPED | OUTPATIENT
Start: 2023-10-03

## 2023-10-03 SDOH — ECONOMIC STABILITY: HOUSING INSECURITY
IN THE LAST 12 MONTHS, WAS THERE A TIME WHEN YOU DID NOT HAVE A STEADY PLACE TO SLEEP OR SLEPT IN A SHELTER (INCLUDING NOW)?: NO

## 2023-10-03 SDOH — ECONOMIC STABILITY: TRANSPORTATION INSECURITY
IN THE PAST 12 MONTHS, HAS LACK OF TRANSPORTATION KEPT YOU FROM MEETINGS, WORK, OR FROM GETTING THINGS NEEDED FOR DAILY LIVING?: NO

## 2023-10-03 SDOH — ECONOMIC STABILITY: INCOME INSECURITY: HOW HARD IS IT FOR YOU TO PAY FOR THE VERY BASICS LIKE FOOD, HOUSING, MEDICAL CARE, AND HEATING?: NOT HARD AT ALL

## 2023-10-03 SDOH — ECONOMIC STABILITY: FOOD INSECURITY: WITHIN THE PAST 12 MONTHS, YOU WORRIED THAT YOUR FOOD WOULD RUN OUT BEFORE YOU GOT MONEY TO BUY MORE.: NEVER TRUE

## 2023-10-03 SDOH — ECONOMIC STABILITY: FOOD INSECURITY: WITHIN THE PAST 12 MONTHS, THE FOOD YOU BOUGHT JUST DIDN'T LAST AND YOU DIDN'T HAVE MONEY TO GET MORE.: NEVER TRUE

## 2023-10-03 ASSESSMENT — PATIENT HEALTH QUESTIONNAIRE - PHQ9
1. LITTLE INTEREST OR PLEASURE IN DOING THINGS: 0
SUM OF ALL RESPONSES TO PHQ QUESTIONS 1-9: 0
SUM OF ALL RESPONSES TO PHQ9 QUESTIONS 1 & 2: 0
SUM OF ALL RESPONSES TO PHQ QUESTIONS 1-9: 0
2. FEELING DOWN, DEPRESSED OR HOPELESS: 0
SUM OF ALL RESPONSES TO PHQ QUESTIONS 1-9: 0
SUM OF ALL RESPONSES TO PHQ QUESTIONS 1-9: 0

## 2023-10-03 ASSESSMENT — ENCOUNTER SYMPTOMS: SHORTNESS OF BREATH: 0

## 2023-10-03 NOTE — PROGRESS NOTES
Josy Brothers, was evaluated through a synchronous (real-time) audio-video encounter. The patient (or guardian if applicable) is aware that this is a billable service, which includes applicable co-pays. This Virtual Visit was conducted with patient's (and/or legal guardian's) consent. Patient identification was verified, and a caregiver was present when appropriate. The patient was located at Other: at work in [place]  Provider was located at [facility] ([facility]): [address] (:  1985) is a Established patient, presenting virtually for evaluation of the following:    Assessment & Plan   Below is the assessment and plan developed based on review of pertinent history, physical exam, labs, studies, and medications. 1. Essential (primary) hypertension  -     hydroCHLOROthiazide (HYDRODIURIL) 25 MG tablet; Take 1 tablet by mouth daily, Disp-90 tablet, R-0Normal    Elevated blood pressures  Increase hydrochlorothiazide    Return in about 6 weeks (around 2023) for HTN. Subjective   Patient presents with: Follow-up  Hypertension    Her home blood pressures have been 141/94, 126/78, 127/91, 135/88, 111/84, 123/89, 104/87,130/93, 123/89, 138/94. Review of Systems   Eyes:  Negative for visual disturbance. Respiratory:  Negative for shortness of breath. Cardiovascular:  Negative for chest pain. Neurological:  Negative for dizziness, speech difficulty, weakness, numbness and headaches. Objective   Patient-Reporte Vitals  No data recorded      /96   Physical Exam  Constitutional:       General: She is not in acute distress. Appearance: Normal appearance. Neurological:      Mental Status: She is alert and oriented to person, place, and time.               --Abdirashid Comer MD

## 2023-11-06 ENCOUNTER — TELEPHONE (OUTPATIENT)
Facility: CLINIC | Age: 38
End: 2023-11-06

## 2023-11-06 NOTE — TELEPHONE ENCOUNTER
----- Message from [facility] JOSE Mclean sent at 11/6/2023 11:07 AM EST -----  Subject: Message to Provider    QUESTIONS  Information for Provider? The pt is a prior pt of Abdirashid Comer and is   scheduled with Vincenzo Isbell MD to establish care on 3/12/24. The pt   would like to confirm that she will still be able to get her medications   refilled since her appt is so far in advance. Please follow up with the pt   to further assist as we were unable to reach the clinical staff.   ---------------------------------------------------------------------------  --------------  [address]  [phone]; OK to leave message on voicemail  ---------------------------------------------------------------------------  --------------  SCRIPT ANSWERS  Relationship to Patient?  Self

## 2024-02-12 DIAGNOSIS — I10 ESSENTIAL (PRIMARY) HYPERTENSION: ICD-10-CM

## 2024-02-12 RX ORDER — HYDROCHLOROTHIAZIDE 25 MG/1
25 TABLET ORAL DAILY
Qty: 90 TABLET | Refills: 0 | OUTPATIENT
Start: 2024-02-12

## 2024-03-09 SDOH — HEALTH STABILITY: PHYSICAL HEALTH: ON AVERAGE, HOW MANY MINUTES DO YOU ENGAGE IN EXERCISE AT THIS LEVEL?: 40 MIN

## 2024-03-09 SDOH — HEALTH STABILITY: PHYSICAL HEALTH: ON AVERAGE, HOW MANY DAYS PER WEEK DO YOU ENGAGE IN MODERATE TO STRENUOUS EXERCISE (LIKE A BRISK WALK)?: 6 DAYS

## 2024-03-12 ENCOUNTER — OFFICE VISIT (OUTPATIENT)
Facility: CLINIC | Age: 39
End: 2024-03-12
Payer: COMMERCIAL

## 2024-03-12 VITALS
TEMPERATURE: 98.2 F | HEIGHT: 63 IN | BODY MASS INDEX: 27.54 KG/M2 | HEART RATE: 76 BPM | SYSTOLIC BLOOD PRESSURE: 136 MMHG | RESPIRATION RATE: 18 BRPM | WEIGHT: 155.4 LBS | DIASTOLIC BLOOD PRESSURE: 84 MMHG

## 2024-03-12 DIAGNOSIS — I10 ESSENTIAL (PRIMARY) HYPERTENSION: ICD-10-CM

## 2024-03-12 DIAGNOSIS — R73.9 ELEVATED BLOOD SUGAR: ICD-10-CM

## 2024-03-12 DIAGNOSIS — I10 ESSENTIAL HYPERTENSION: ICD-10-CM

## 2024-03-12 DIAGNOSIS — E78.00 ELEVATED CHOLESTEROL: Primary | ICD-10-CM

## 2024-03-12 PROCEDURE — 99214 OFFICE O/P EST MOD 30 MIN: CPT | Performed by: FAMILY MEDICINE

## 2024-03-12 PROCEDURE — 3075F SYST BP GE 130 - 139MM HG: CPT | Performed by: FAMILY MEDICINE

## 2024-03-12 PROCEDURE — 3079F DIAST BP 80-89 MM HG: CPT | Performed by: FAMILY MEDICINE

## 2024-03-12 RX ORDER — HYDROCHLOROTHIAZIDE 25 MG/1
25 TABLET ORAL DAILY
Qty: 90 TABLET | Refills: 0 | Status: CANCELLED | OUTPATIENT
Start: 2024-03-12

## 2024-03-12 SDOH — ECONOMIC STABILITY: FOOD INSECURITY: WITHIN THE PAST 12 MONTHS, THE FOOD YOU BOUGHT JUST DIDN'T LAST AND YOU DIDN'T HAVE MONEY TO GET MORE.: NEVER TRUE

## 2024-03-12 SDOH — ECONOMIC STABILITY: FOOD INSECURITY: WITHIN THE PAST 12 MONTHS, YOU WORRIED THAT YOUR FOOD WOULD RUN OUT BEFORE YOU GOT MONEY TO BUY MORE.: NEVER TRUE

## 2024-03-12 SDOH — ECONOMIC STABILITY: INCOME INSECURITY: HOW HARD IS IT FOR YOU TO PAY FOR THE VERY BASICS LIKE FOOD, HOUSING, MEDICAL CARE, AND HEATING?: NOT HARD AT ALL

## 2024-03-12 ASSESSMENT — PATIENT HEALTH QUESTIONNAIRE - PHQ9
SUM OF ALL RESPONSES TO PHQ9 QUESTIONS 1 & 2: 0
SUM OF ALL RESPONSES TO PHQ QUESTIONS 1-9: 0
1. LITTLE INTEREST OR PLEASURE IN DOING THINGS: 0
SUM OF ALL RESPONSES TO PHQ QUESTIONS 1-9: 0
2. FEELING DOWN, DEPRESSED OR HOPELESS: 0
SUM OF ALL RESPONSES TO PHQ QUESTIONS 1-9: 0
SUM OF ALL RESPONSES TO PHQ QUESTIONS 1-9: 0

## 2024-03-12 NOTE — PROGRESS NOTES
Family Medicine Initial Office Visit  Patient: Nahomi Leo  1985, 39 y.o., female  Encounter Date: 3/12/2024    ASSESSMENT & PLAN  Nahomi Leo is a 39 y.o. female with HTN who presented for established care with below concerns:    1. Elevated cholesterol  Overview:  Lab Results   Component Value Date    CHOL 207 (H) 08/03/2023    CHOL 222 (H) 08/11/2020     Lab Results   Component Value Date    TRIG 118 08/03/2023    TRIG 133 08/11/2020     Lab Results   Component Value Date     08/03/2023     08/11/2020     Lab Results   Component Value Date    LDLCALC 82.4 08/03/2023    LDLCALC 84.4 08/11/2020     No results found for: \"VLDL\"  Lab Results   Component Value Date    CHOLHDLRATIO 2.0 08/03/2023    CHOLHDLRATIO 2.0 08/11/2020     Medication: none  Assessment & Plan:   Borderline controlled, lifestyle modifications recommended  Orders:  -     Hemoglobin A1C; Future  -     Lipid Panel; Future  2. Essential (primary) hypertension  -     Thyroid Cascade Profile; Future  -     Comprehensive Metabolic Panel; Future  -     CBC; Future  -     Aldosterone & Renin, Direct with Ratio; Future  3. Essential hypertension  Overview:  Background: dx in 2021    BP Readings from Last 3 Encounters:   03/12/24 136/84   08/02/23 (!) 140/91   12/13/21 134/87     Medication: none  Prev Meds: HCTZ (script ran out)    Interval Hx:  - Beach Body exercises (cardio-strength training)  - has noticed improvements in BP since starting physical activity.  - foods: yogurt,   Assessment & Plan:   Borderline controlled, continue current treatment plan and lifestyle modifications recommended. Hold off on med refill for now.  4. Elevated blood sugar  -     Hemoglobin A1C; Future          No follow-ups on file.    Patient Instructions   Keywords and Lifestyle Strategies to consider and look up:    ACTIVITY:  Strength and muscle building burns fats even on rest/recover days, and can even help with healthy physiologic testosterone

## 2024-03-12 NOTE — PROGRESS NOTES
Chief Complaint   Patient presents with    Establish Care    Medication Refill     \"Have you been to the ER, urgent care clinic since your last visit?  Hospitalized since your last visit?\"    NO    “Have you seen or consulted any other health care providers outside of [facility] since your last visit?”    NO        “Have you had a pap smear?”    NO

## 2024-03-12 NOTE — ASSESSMENT & PLAN NOTE
Borderline controlled, continue current treatment plan and lifestyle modifications recommended. Hold off on med refill for now.

## 2024-12-25 NOTE — PROGRESS NOTES
HISTORY OF PRESENT ILLNESS  Rod Mishra is a 29 y.o. female. Patient presents with: Follow-up  Hypertension          Review of Systems   Eyes: Negative for blurred vision. Respiratory: Negative for shortness of breath. Cardiovascular: Negative for chest pain. Neurological: Negative for dizziness, sensory change, speech change, focal weakness and headaches. Visit Vitals  /87 (BP 1 Location: Left upper arm, BP Patient Position: Sitting, BP Cuff Size: Adult)   Pulse 70   Temp 97.5 °F (36.4 °C) (Temporal)   Ht 5' 3\" (1.6 m)   Wt 138 lb (62.6 kg)   SpO2 99%   BMI 24.45 kg/m²     Physical Exam  Vitals and nursing note reviewed. Constitutional:       General: She is not in acute distress. Appearance: She is well-developed. She is not diaphoretic. Cardiovascular:      Rate and Rhythm: Normal rate and regular rhythm. Heart sounds: Normal heart sounds. No murmur heard. No friction rub. No gallop. Pulmonary:      Effort: Pulmonary effort is normal. No respiratory distress. Breath sounds: Normal breath sounds. No wheezing or rales. Skin:     General: Skin is warm and dry. Neurological:      Mental Status: She is alert and oriented to person, place, and time. ASSESSMENT and PLAN    ICD-10-CM ICD-9-CM    1. Essential hypertension  I10 401.9 hydroCHLOROthiazide (HYDRODIURIL) 12.5 mg tablet      METABOLIC PANEL, BASIC      CANCELED: METABOLIC PANEL, BASIC        Blood pressure controlled  Labs per orders. Continue current plans. Refills per orders    Follow-up and Dispositions    · Return in about 6 months (around 6/13/2022) for blood pressure. Reviewed plan of care. Patient has provided input and agrees with goals.
Identified pt with two pt identifiers(name and ). Reviewed record in preparation for visit and have obtained necessary documentation. Chief Complaint   Patient presents with    Follow-up    Hypertension        Vitals:    21 1457 21 1507   BP: (!) 132/92 134/87   Pulse: 68 70   Temp: 97.5 °F (36.4 °C)    TempSrc: Temporal    SpO2: 99%    Weight: 138 lb (62.6 kg)    Height: 5' 3\" (1.6 m)    PainSc:   0 - No pain        Health Maintenance Due   Topic    Cervical cancer screen        Coordination of Care Questionnaire:  :   1) Have you been to an emergency room, urgent care, or hospitalized since your last visit? If yes, where when, and reason for visit? No      2. Have seen or consulted any other health care provider since your last visit? If yes, where when, and reason for visit?   No
The patient is a 30y year old Female complaining of fever.

## 2025-02-24 DIAGNOSIS — E78.00 ELEVATED CHOLESTEROL: ICD-10-CM

## 2025-02-24 DIAGNOSIS — R73.9 ELEVATED BLOOD SUGAR: ICD-10-CM

## 2025-02-24 DIAGNOSIS — I10 ESSENTIAL (PRIMARY) HYPERTENSION: ICD-10-CM

## 2025-02-25 LAB — TSH SERPL DL<=0.05 MIU/L-ACNC: 1 UIU/ML (ref 0.45–4.5)

## 2025-02-26 LAB
ALBUMIN SERPL-MCNC: 4.5 G/DL (ref 3.5–5)
ALBUMIN/GLOB SERPL: 1.4 (ref 1.1–2.2)
ALP SERPL-CCNC: 74 U/L (ref 45–117)
ALT SERPL-CCNC: 20 U/L (ref 12–78)
ANION GAP SERPL CALC-SCNC: 8 MMOL/L (ref 2–12)
AST SERPL-CCNC: 13 U/L (ref 15–37)
BILIRUB SERPL-MCNC: 0.7 MG/DL (ref 0.2–1)
BUN SERPL-MCNC: 10 MG/DL (ref 6–20)
BUN/CREAT SERPL: 16 (ref 12–20)
CALCIUM SERPL-MCNC: 9.7 MG/DL (ref 8.5–10.1)
CHLORIDE SERPL-SCNC: 102 MMOL/L (ref 97–108)
CHOLEST SERPL-MCNC: 232 MG/DL
CO2 SERPL-SCNC: 25 MMOL/L (ref 21–32)
CREAT SERPL-MCNC: 0.62 MG/DL (ref 0.55–1.02)
ERYTHROCYTE [DISTWIDTH] IN BLOOD BY AUTOMATED COUNT: 12.5 % (ref 11.5–14.5)
EST. AVERAGE GLUCOSE BLD GHB EST-MCNC: 103 MG/DL
GLOBULIN SER CALC-MCNC: 3.3 G/DL (ref 2–4)
GLUCOSE SERPL-MCNC: 81 MG/DL (ref 65–100)
HBA1C MFR BLD: 5.2 % (ref 4–5.6)
HCT VFR BLD AUTO: 42.8 % (ref 35–47)
HDLC SERPL-MCNC: 99 MG/DL
HDLC SERPL: 2.3 (ref 0–5)
HGB BLD-MCNC: 14.1 G/DL (ref 11.5–16)
LDLC SERPL CALC-MCNC: 117.8 MG/DL (ref 0–100)
MCH RBC QN AUTO: 30.7 PG (ref 26–34)
MCHC RBC AUTO-ENTMCNC: 32.9 G/DL (ref 30–36.5)
MCV RBC AUTO: 93.2 FL (ref 80–99)
NRBC # BLD: 0 K/UL (ref 0–0.01)
NRBC BLD-RTO: 0 PER 100 WBC
PLATELET # BLD AUTO: 378 K/UL (ref 150–400)
PMV BLD AUTO: 10.1 FL (ref 8.9–12.9)
POTASSIUM SERPL-SCNC: 4.7 MMOL/L (ref 3.5–5.1)
PROT SERPL-MCNC: 7.8 G/DL (ref 6.4–8.2)
RBC # BLD AUTO: 4.59 M/UL (ref 3.8–5.2)
SODIUM SERPL-SCNC: 135 MMOL/L (ref 136–145)
TRIGL SERPL-MCNC: 76 MG/DL
VLDLC SERPL CALC-MCNC: 15.2 MG/DL
WBC # BLD AUTO: 9.6 K/UL (ref 3.6–11)

## 2025-03-01 LAB
ALDOST SERPL-MCNC: 5.1 NG/DL (ref 0–30)
ALDOST/RENIN PLAS-RTO: 7.8 (ref 0–30)
RENIN PLAS-CCNC: 0.65 NG/ML/HR (ref 0.17–5.38)

## 2025-03-05 NOTE — PROGRESS NOTES
Chief Complaint   Patient presents with    Establish Care    Hypertension    Labs     cholesterol      Pt is having virtual appointment with Dr. Bright Tapia in [place], [place]. Number Of Freeze-Thaw Cycles: 2 freeze-thaw cycles

## (undated) DEVICE — (D)STRIP SKN CLSR 0.5X4IN WHT --

## (undated) DEVICE — MASTISOL ADHESIVE LIQ 2/3ML

## (undated) DEVICE — HANDLE LT SNAP ON ULT DURABLE LENS FOR TRUMPF ALC DISPOSABLE

## (undated) DEVICE — ABDOMINAL PAD: Brand: DERMACEA

## (undated) DEVICE — SUTURE VCRL SZ 0 L36IN ABSRB VLT L40MM CT 1/2 CIR J358H

## (undated) DEVICE — STERILE POLYISOPRENE POWDER-FREE SURGICAL GLOVES: Brand: PROTEXIS

## (undated) DEVICE — C-SECTION II-LF: Brand: MEDLINE INDUSTRIES, INC.

## (undated) DEVICE — LARGE, DISPOSABLE ALEXIS O C-SECTION PROTECTOR - RETRACTOR: Brand: ALEXIS ® O C-SECTION PROTECTOR - RETRACTOR

## (undated) DEVICE — (D)PREP SKN CHLRAPRP APPL 26ML -- CONVERT TO ITEM 371833

## (undated) DEVICE — SUTURE VCRL SZ 2-0 L36IN ABSRB UD L36MM CT-1 1/2 CIR J945H

## (undated) DEVICE — KENDALL SCD EXPRESS SLEEVES, KNEE LENGTH, LARGE: Brand: KENDALL SCD

## (undated) DEVICE — 3000CC GUARDIAN II: Brand: GUARDIAN

## (undated) DEVICE — TOWEL,OR,DSP,ST,BLUE,STD,2/PK,40PK/CS: Brand: MEDLINE

## (undated) DEVICE — SUTURE MCRYL SZ 4-0 L27IN ABSRB UD L19MM PS-2 1/2 CIR PRIM Y426H

## (undated) DEVICE — BULB SYRINGE, IRRIGATION WITH PROTECTIVE CAP, 60 CC, INDIVIDUALLY WRAPPED: Brand: DOVER

## (undated) DEVICE — SUTURE ABSRB BRAID COAT UD CT NO 1 36IN VCRL J959H

## (undated) DEVICE — REM POLYHESIVE ADULT PATIENT RETURN ELECTRODE: Brand: VALLEYLAB

## (undated) DEVICE — SOLIDIFIER FLUID 3000 CC ABSORB

## (undated) DEVICE — TIP CLEANER: Brand: VALLEYLAB

## (undated) DEVICE — SOLUTION IV 1000ML 0.9% SOD CHL

## (undated) DEVICE — ROCKER SWITCH PENCIL HOLSTER: Brand: VALLEYLAB